# Patient Record
Sex: FEMALE | Race: WHITE | Employment: OTHER | URBAN - METROPOLITAN AREA
[De-identification: names, ages, dates, MRNs, and addresses within clinical notes are randomized per-mention and may not be internally consistent; named-entity substitution may affect disease eponyms.]

---

## 2024-05-18 RX ORDER — PANTOPRAZOLE SODIUM 40 MG/1
40 TABLET, DELAYED RELEASE ORAL DAILY
COMMUNITY
Start: 2024-04-30

## 2024-05-21 ENCOUNTER — LAB ENCOUNTER (OUTPATIENT)
Dept: LAB | Age: 89
DRG: 330 | End: 2024-05-21
Attending: COLON & RECTAL SURGERY

## 2024-05-21 ENCOUNTER — LAB ENCOUNTER (OUTPATIENT)
Dept: LAB | Age: 89
End: 2024-05-21
Attending: COLON & RECTAL SURGERY

## 2024-05-21 DIAGNOSIS — Z01.818 PREOP TESTING: ICD-10-CM

## 2024-05-21 LAB
ANION GAP SERPL CALC-SCNC: 5 MMOL/L (ref 0–18)
ANTIBODY SCREEN: POSITIVE
BASOPHILS # BLD AUTO: 0.06 X10(3) UL (ref 0–0.2)
BASOPHILS NFR BLD AUTO: 0.6 %
BUN BLD-MCNC: 17 MG/DL (ref 9–23)
BUN/CREAT SERPL: 24.3 (ref 10–20)
CALCIUM BLD-MCNC: 9.7 MG/DL (ref 8.7–10.4)
CHLORIDE SERPL-SCNC: 108 MMOL/L (ref 98–112)
CO2 SERPL-SCNC: 25 MMOL/L (ref 21–32)
CREAT BLD-MCNC: 0.7 MG/DL
DEPRECATED RDW RBC AUTO: 49.6 FL (ref 35.1–46.3)
DIRECT COOMBS POLY: NEGATIVE
EGFRCR SERPLBLD CKD-EPI 2021: 84 ML/MIN/1.73M2 (ref 60–?)
EOSINOPHIL # BLD AUTO: 0.24 X10(3) UL (ref 0–0.7)
EOSINOPHIL NFR BLD AUTO: 2.6 %
ERYTHROCYTE [DISTWIDTH] IN BLOOD BY AUTOMATED COUNT: 20.5 % (ref 11–15)
FYA ANTIGEN: NEGATIVE
GLUCOSE BLD-MCNC: 101 MG/DL (ref 70–99)
HCT VFR BLD AUTO: 30.8 %
HGB BLD-MCNC: 9.1 G/DL
IMM GRANULOCYTES # BLD AUTO: 0.02 X10(3) UL (ref 0–1)
IMM GRANULOCYTES NFR BLD: 0.2 %
LYMPHOCYTES # BLD AUTO: 2.38 X10(3) UL (ref 1–4)
LYMPHOCYTES NFR BLD AUTO: 25.6 %
MCH RBC QN AUTO: 20.4 PG (ref 26–34)
MCHC RBC AUTO-ENTMCNC: 29.5 G/DL (ref 31–37)
MCV RBC AUTO: 68.9 FL
MONOCYTES # BLD AUTO: 0.84 X10(3) UL (ref 0.1–1)
MONOCYTES NFR BLD AUTO: 9 %
NEUTROPHILS # BLD AUTO: 5.76 X10 (3) UL (ref 1.5–7.7)
NEUTROPHILS # BLD AUTO: 5.76 X10(3) UL (ref 1.5–7.7)
NEUTROPHILS NFR BLD AUTO: 62 %
OSMOLALITY SERPL CALC.SUM OF ELEC: 288 MOSM/KG (ref 275–295)
PLATELET # BLD AUTO: 311 10(3)UL (ref 150–450)
PLATELETS.RETICULATED NFR BLD AUTO: 6.2 % (ref 0–7)
POTASSIUM SERPL-SCNC: 4.2 MMOL/L (ref 3.5–5.1)
RBC # BLD AUTO: 4.47 X10(6)UL
RH BLOOD TYPE: POSITIVE
SODIUM SERPL-SCNC: 138 MMOL/L (ref 136–145)
WBC # BLD AUTO: 9.3 X10(3) UL (ref 4–11)

## 2024-05-21 PROCEDURE — 86077 PHYS BLOOD BANK SERV XMATCH: CPT

## 2024-05-21 PROCEDURE — 86922 COMPATIBILITY TEST ANTIGLOB: CPT

## 2024-05-21 PROCEDURE — 86901 BLOOD TYPING SEROLOGIC RH(D): CPT

## 2024-05-21 PROCEDURE — 85025 COMPLETE CBC W/AUTO DIFF WBC: CPT

## 2024-05-21 PROCEDURE — 86905 BLOOD TYPING RBC ANTIGENS: CPT

## 2024-05-21 PROCEDURE — 36415 COLL VENOUS BLD VENIPUNCTURE: CPT

## 2024-05-21 PROCEDURE — 86880 COOMBS TEST DIRECT: CPT

## 2024-05-21 PROCEDURE — 86850 RBC ANTIBODY SCREEN: CPT

## 2024-05-21 PROCEDURE — 85060 BLOOD SMEAR INTERPRETATION: CPT

## 2024-05-21 PROCEDURE — 80048 BASIC METABOLIC PNL TOTAL CA: CPT

## 2024-05-21 PROCEDURE — 86900 BLOOD TYPING SEROLOGIC ABO: CPT

## 2024-05-21 PROCEDURE — 86870 RBC ANTIBODY IDENTIFICATION: CPT

## 2024-05-22 ENCOUNTER — ANESTHESIA EVENT (OUTPATIENT)
Dept: SURGERY | Facility: HOSPITAL | Age: 89
DRG: 330 | End: 2024-05-22

## 2024-05-22 LAB — E ANTIGEN: NEGATIVE

## 2024-05-23 ENCOUNTER — ANESTHESIA (OUTPATIENT)
Dept: SURGERY | Facility: HOSPITAL | Age: 89
DRG: 330 | End: 2024-05-23

## 2024-05-23 ENCOUNTER — HOSPITAL ENCOUNTER (INPATIENT)
Facility: HOSPITAL | Age: 89
LOS: 5 days | Discharge: HOME | DRG: 330 | End: 2024-05-28
Attending: COLON & RECTAL SURGERY | Admitting: COLON & RECTAL SURGERY

## 2024-05-23 DIAGNOSIS — Z01.818 PREOP TESTING: Primary | ICD-10-CM

## 2024-05-23 DIAGNOSIS — K57.92 DIVERTICULITIS: ICD-10-CM

## 2024-05-23 LAB
HCT VFR BLD AUTO: 24.5 %
HCT VFR BLD AUTO: 31.6 %
HGB BLD-MCNC: 7.4 G/DL
HGB BLD-MCNC: 9.9 G/DL
RH BLOOD TYPE: POSITIVE

## 2024-05-23 PROCEDURE — 88305 TISSUE EXAM BY PATHOLOGIST: CPT | Performed by: COLON & RECTAL SURGERY

## 2024-05-23 PROCEDURE — 30233N1 TRANSFUSION OF NONAUTOLOGOUS RED BLOOD CELLS INTO PERIPHERAL VEIN, PERCUTANEOUS APPROACH: ICD-10-PCS | Performed by: ANESTHESIOLOGY

## 2024-05-23 PROCEDURE — 85018 HEMOGLOBIN: CPT | Performed by: NURSE ANESTHETIST, CERTIFIED REGISTERED

## 2024-05-23 PROCEDURE — 36430 TRANSFUSION BLD/BLD COMPNT: CPT | Performed by: STUDENT IN AN ORGANIZED HEALTH CARE EDUCATION/TRAINING PROGRAM

## 2024-05-23 PROCEDURE — 85014 HEMATOCRIT: CPT | Performed by: ANESTHESIOLOGY

## 2024-05-23 PROCEDURE — 85018 HEMOGLOBIN: CPT | Performed by: ANESTHESIOLOGY

## 2024-05-23 PROCEDURE — 0TN74ZZ RELEASE LEFT URETER, PERCUTANEOUS ENDOSCOPIC APPROACH: ICD-10-PCS | Performed by: COLON & RECTAL SURGERY

## 2024-05-23 PROCEDURE — 0TN64ZZ RELEASE RIGHT URETER, PERCUTANEOUS ENDOSCOPIC APPROACH: ICD-10-PCS | Performed by: COLON & RECTAL SURGERY

## 2024-05-23 PROCEDURE — P9045 ALBUMIN (HUMAN), 5%, 250 ML: HCPCS

## 2024-05-23 PROCEDURE — 88307 TISSUE EXAM BY PATHOLOGIST: CPT | Performed by: COLON & RECTAL SURGERY

## 2024-05-23 PROCEDURE — S0028 INJECTION, FAMOTIDINE, 20 MG: HCPCS | Performed by: COLON & RECTAL SURGERY

## 2024-05-23 PROCEDURE — 0WUF47Z SUPPLEMENT ABDOMINAL WALL WITH AUTOLOGOUS TISSUE SUBSTITUTE, PERCUTANEOUS ENDOSCOPIC APPROACH: ICD-10-PCS | Performed by: COLON & RECTAL SURGERY

## 2024-05-23 PROCEDURE — 0TQB4ZZ REPAIR BLADDER, PERCUTANEOUS ENDOSCOPIC APPROACH: ICD-10-PCS | Performed by: COLON & RECTAL SURGERY

## 2024-05-23 PROCEDURE — 0UQG4ZZ REPAIR VAGINA, PERCUTANEOUS ENDOSCOPIC APPROACH: ICD-10-PCS | Performed by: COLON & RECTAL SURGERY

## 2024-05-23 PROCEDURE — P9045 ALBUMIN (HUMAN), 5%, 250 ML: HCPCS | Performed by: NURSE ANESTHETIST, CERTIFIED REGISTERED

## 2024-05-23 PROCEDURE — 0DTN4ZZ RESECTION OF SIGMOID COLON, PERCUTANEOUS ENDOSCOPIC APPROACH: ICD-10-PCS | Performed by: COLON & RECTAL SURGERY

## 2024-05-23 PROCEDURE — 85014 HEMATOCRIT: CPT | Performed by: NURSE ANESTHETIST, CERTIFIED REGISTERED

## 2024-05-23 PROCEDURE — 0DJD8ZZ INSPECTION OF LOWER INTESTINAL TRACT, VIA NATURAL OR ARTIFICIAL OPENING ENDOSCOPIC: ICD-10-PCS | Performed by: COLON & RECTAL SURGERY

## 2024-05-23 PROCEDURE — 0T788DZ DILATION OF BILATERAL URETERS WITH INTRALUMINAL DEVICE, VIA NATURAL OR ARTIFICIAL OPENING ENDOSCOPIC: ICD-10-PCS | Performed by: COLON & RECTAL SURGERY

## 2024-05-23 RX ORDER — HYDROMORPHONE HYDROCHLORIDE 1 MG/ML
0.2 INJECTION, SOLUTION INTRAMUSCULAR; INTRAVENOUS; SUBCUTANEOUS EVERY 5 MIN PRN
Status: DISCONTINUED | OUTPATIENT
Start: 2024-05-23 | End: 2024-05-23 | Stop reason: HOSPADM

## 2024-05-23 RX ORDER — HYDROMORPHONE HYDROCHLORIDE 1 MG/ML
0.4 INJECTION, SOLUTION INTRAMUSCULAR; INTRAVENOUS; SUBCUTANEOUS EVERY 2 HOUR PRN
Status: DISCONTINUED | OUTPATIENT
Start: 2024-05-23 | End: 2024-05-28

## 2024-05-23 RX ORDER — INDOCYANINE GREEN AND WATER 25 MG
KIT INJECTION AS NEEDED
Status: DISCONTINUED | OUTPATIENT
Start: 2024-05-23 | End: 2024-05-23 | Stop reason: HOSPADM

## 2024-05-23 RX ORDER — HYDRALAZINE HYDROCHLORIDE 20 MG/ML
5 INJECTION INTRAMUSCULAR; INTRAVENOUS EVERY 10 MIN PRN
Status: DISCONTINUED | OUTPATIENT
Start: 2024-05-23 | End: 2024-05-23 | Stop reason: HOSPADM

## 2024-05-23 RX ORDER — MORPHINE SULFATE 4 MG/ML
4 INJECTION, SOLUTION INTRAMUSCULAR; INTRAVENOUS EVERY 10 MIN PRN
Status: DISCONTINUED | OUTPATIENT
Start: 2024-05-23 | End: 2024-05-23 | Stop reason: HOSPADM

## 2024-05-23 RX ORDER — ACETAMINOPHEN 500 MG
1000 TABLET ORAL ONCE
Status: DISCONTINUED | OUTPATIENT
Start: 2024-05-23 | End: 2024-05-23 | Stop reason: HOSPADM

## 2024-05-23 RX ORDER — SODIUM CHLORIDE, SODIUM LACTATE, POTASSIUM CHLORIDE, CALCIUM CHLORIDE 600; 310; 30; 20 MG/100ML; MG/100ML; MG/100ML; MG/100ML
INJECTION, SOLUTION INTRAVENOUS CONTINUOUS PRN
Status: DISCONTINUED | OUTPATIENT
Start: 2024-05-23 | End: 2024-05-23 | Stop reason: SURG

## 2024-05-23 RX ORDER — SODIUM CHLORIDE, SODIUM LACTATE, POTASSIUM CHLORIDE, CALCIUM CHLORIDE 600; 310; 30; 20 MG/100ML; MG/100ML; MG/100ML; MG/100ML
INJECTION, SOLUTION INTRAVENOUS CONTINUOUS
Status: DISCONTINUED | OUTPATIENT
Start: 2024-05-23 | End: 2024-05-28

## 2024-05-23 RX ORDER — OXYCODONE HYDROCHLORIDE 5 MG/1
5 TABLET ORAL EVERY 4 HOURS PRN
Status: DISCONTINUED | OUTPATIENT
Start: 2024-05-23 | End: 2024-05-28

## 2024-05-23 RX ORDER — HEPARIN SODIUM 5000 [USP'U]/ML
5000 INJECTION, SOLUTION INTRAVENOUS; SUBCUTANEOUS EVERY 8 HOURS SCHEDULED
Status: DISCONTINUED | OUTPATIENT
Start: 2024-05-24 | End: 2024-05-28

## 2024-05-23 RX ORDER — FAMOTIDINE 10 MG/ML
20 INJECTION, SOLUTION INTRAVENOUS DAILY
Status: DISCONTINUED | OUTPATIENT
Start: 2024-05-23 | End: 2024-05-28

## 2024-05-23 RX ORDER — HEPARIN SODIUM 5000 [USP'U]/ML
5000 INJECTION, SOLUTION INTRAVENOUS; SUBCUTANEOUS ONCE
Status: COMPLETED | OUTPATIENT
Start: 2024-05-23 | End: 2024-05-23

## 2024-05-23 RX ORDER — SODIUM CHLORIDE 9 MG/ML
INJECTION, SOLUTION INTRAVENOUS CONTINUOUS PRN
Status: DISCONTINUED | OUTPATIENT
Start: 2024-05-23 | End: 2024-05-23 | Stop reason: SURG

## 2024-05-23 RX ORDER — HYDROMORPHONE HYDROCHLORIDE 1 MG/ML
0.6 INJECTION, SOLUTION INTRAMUSCULAR; INTRAVENOUS; SUBCUTANEOUS EVERY 5 MIN PRN
Status: DISCONTINUED | OUTPATIENT
Start: 2024-05-23 | End: 2024-05-23 | Stop reason: HOSPADM

## 2024-05-23 RX ORDER — METRONIDAZOLE 500 MG/100ML
500 INJECTION, SOLUTION INTRAVENOUS ONCE
Status: COMPLETED | OUTPATIENT
Start: 2024-05-23 | End: 2024-05-23

## 2024-05-23 RX ORDER — METRONIDAZOLE 500 MG/100ML
500 INJECTION, SOLUTION INTRAVENOUS EVERY 8 HOURS
Status: COMPLETED | OUTPATIENT
Start: 2024-05-23 | End: 2024-05-24

## 2024-05-23 RX ORDER — FAMOTIDINE 20 MG/1
20 TABLET, FILM COATED ORAL DAILY
Status: DISCONTINUED | OUTPATIENT
Start: 2024-05-23 | End: 2024-05-28

## 2024-05-23 RX ORDER — MAGNESIUM OXIDE 400 MG/1
400 TABLET ORAL DAILY
Status: DISCONTINUED | OUTPATIENT
Start: 2024-05-23 | End: 2024-05-28

## 2024-05-23 RX ORDER — GLYCOPYRROLATE 0.2 MG/ML
INJECTION, SOLUTION INTRAMUSCULAR; INTRAVENOUS AS NEEDED
Status: DISCONTINUED | OUTPATIENT
Start: 2024-05-23 | End: 2024-05-23 | Stop reason: SURG

## 2024-05-23 RX ORDER — MORPHINE SULFATE 10 MG/ML
6 INJECTION, SOLUTION INTRAMUSCULAR; INTRAVENOUS EVERY 10 MIN PRN
Status: DISCONTINUED | OUTPATIENT
Start: 2024-05-23 | End: 2024-05-23 | Stop reason: HOSPADM

## 2024-05-23 RX ORDER — ROCURONIUM BROMIDE 10 MG/ML
INJECTION, SOLUTION INTRAVENOUS AS NEEDED
Status: DISCONTINUED | OUTPATIENT
Start: 2024-05-23 | End: 2024-05-23 | Stop reason: SURG

## 2024-05-23 RX ORDER — NALOXONE HYDROCHLORIDE 0.4 MG/ML
80 INJECTION, SOLUTION INTRAMUSCULAR; INTRAVENOUS; SUBCUTANEOUS AS NEEDED
Status: DISCONTINUED | OUTPATIENT
Start: 2024-05-23 | End: 2024-05-23 | Stop reason: HOSPADM

## 2024-05-23 RX ORDER — SODIUM CHLORIDE, SODIUM LACTATE, POTASSIUM CHLORIDE, CALCIUM CHLORIDE 600; 310; 30; 20 MG/100ML; MG/100ML; MG/100ML; MG/100ML
INJECTION, SOLUTION INTRAVENOUS CONTINUOUS
Status: DISCONTINUED | OUTPATIENT
Start: 2024-05-23 | End: 2024-05-23 | Stop reason: HOSPADM

## 2024-05-23 RX ORDER — METOCLOPRAMIDE HYDROCHLORIDE 5 MG/ML
10 INJECTION INTRAMUSCULAR; INTRAVENOUS EVERY 8 HOURS PRN
Status: DISCONTINUED | OUTPATIENT
Start: 2024-05-23 | End: 2024-05-28

## 2024-05-23 RX ORDER — MAGNESIUM SULFATE HEPTAHYDRATE 500 MG/ML
INJECTION, SOLUTION INTRAMUSCULAR; INTRAVENOUS AS NEEDED
Status: DISCONTINUED | OUTPATIENT
Start: 2024-05-23 | End: 2024-05-23 | Stop reason: SURG

## 2024-05-23 RX ORDER — HYDROMORPHONE HYDROCHLORIDE 1 MG/ML
0.4 INJECTION, SOLUTION INTRAMUSCULAR; INTRAVENOUS; SUBCUTANEOUS EVERY 5 MIN PRN
Status: DISCONTINUED | OUTPATIENT
Start: 2024-05-23 | End: 2024-05-23 | Stop reason: HOSPADM

## 2024-05-23 RX ORDER — ALBUMIN, HUMAN INJ 5% 5 %
SOLUTION INTRAVENOUS CONTINUOUS PRN
Status: DISCONTINUED | OUTPATIENT
Start: 2024-05-23 | End: 2024-05-23 | Stop reason: SURG

## 2024-05-23 RX ORDER — EPHEDRINE SULFATE 50 MG/ML
INJECTION, SOLUTION INTRAVENOUS AS NEEDED
Status: DISCONTINUED | OUTPATIENT
Start: 2024-05-23 | End: 2024-05-23 | Stop reason: SURG

## 2024-05-23 RX ORDER — LIDOCAINE HYDROCHLORIDE 10 MG/ML
INJECTION, SOLUTION EPIDURAL; INFILTRATION; INTRACAUDAL; PERINEURAL AS NEEDED
Status: DISCONTINUED | OUTPATIENT
Start: 2024-05-23 | End: 2024-05-23 | Stop reason: SURG

## 2024-05-23 RX ORDER — SENNOSIDES 8.6 MG
17.2 TABLET ORAL NIGHTLY PRN
Status: DISCONTINUED | OUTPATIENT
Start: 2024-05-23 | End: 2024-05-28

## 2024-05-23 RX ORDER — POLYETHYLENE GLYCOL 3350 17 G/17G
17 POWDER, FOR SOLUTION ORAL DAILY PRN
Status: DISCONTINUED | OUTPATIENT
Start: 2024-05-23 | End: 2024-05-28

## 2024-05-23 RX ORDER — HYDROMORPHONE HYDROCHLORIDE 1 MG/ML
0.2 INJECTION, SOLUTION INTRAMUSCULAR; INTRAVENOUS; SUBCUTANEOUS EVERY 2 HOUR PRN
Status: DISCONTINUED | OUTPATIENT
Start: 2024-05-23 | End: 2024-05-28

## 2024-05-23 RX ORDER — ONDANSETRON 2 MG/ML
4 INJECTION INTRAMUSCULAR; INTRAVENOUS EVERY 6 HOURS PRN
Status: DISCONTINUED | OUTPATIENT
Start: 2024-05-23 | End: 2024-05-28

## 2024-05-23 RX ORDER — LIDOCAINE HYDROCHLORIDE ANHYDROUS AND DEXTROSE MONOHYDRATE .8; 5 G/100ML; G/100ML
INJECTION, SOLUTION INTRAVENOUS CONTINUOUS PRN
Status: DISCONTINUED | OUTPATIENT
Start: 2024-05-23 | End: 2024-05-23 | Stop reason: SURG

## 2024-05-23 RX ORDER — SODIUM CHLORIDE, SODIUM LACTATE, POTASSIUM CHLORIDE, CALCIUM CHLORIDE 600; 310; 30; 20 MG/100ML; MG/100ML; MG/100ML; MG/100ML
2 INJECTION, SOLUTION INTRAVENOUS CONTINUOUS
Status: DISCONTINUED | OUTPATIENT
Start: 2024-05-23 | End: 2024-05-26

## 2024-05-23 RX ORDER — DEXAMETHASONE SODIUM PHOSPHATE 4 MG/ML
VIAL (ML) INJECTION AS NEEDED
Status: DISCONTINUED | OUTPATIENT
Start: 2024-05-23 | End: 2024-05-23 | Stop reason: SURG

## 2024-05-23 RX ORDER — MORPHINE SULFATE 4 MG/ML
2 INJECTION, SOLUTION INTRAMUSCULAR; INTRAVENOUS EVERY 10 MIN PRN
Status: DISCONTINUED | OUTPATIENT
Start: 2024-05-23 | End: 2024-05-23 | Stop reason: HOSPADM

## 2024-05-23 RX ORDER — PHENYLEPHRINE HCL 10 MG/ML
VIAL (ML) INJECTION AS NEEDED
Status: DISCONTINUED | OUTPATIENT
Start: 2024-05-23 | End: 2024-05-23 | Stop reason: SURG

## 2024-05-23 RX ORDER — OXYCODONE HYDROCHLORIDE 5 MG/1
2.5 TABLET ORAL EVERY 4 HOURS PRN
Status: DISCONTINUED | OUTPATIENT
Start: 2024-05-23 | End: 2024-05-28

## 2024-05-23 RX ORDER — ONDANSETRON 2 MG/ML
INJECTION INTRAMUSCULAR; INTRAVENOUS AS NEEDED
Status: DISCONTINUED | OUTPATIENT
Start: 2024-05-23 | End: 2024-05-23 | Stop reason: SURG

## 2024-05-23 RX ADMIN — SODIUM CHLORIDE, SODIUM LACTATE, POTASSIUM CHLORIDE, CALCIUM CHLORIDE: 600; 310; 30; 20 INJECTION, SOLUTION INTRAVENOUS at 11:04:00

## 2024-05-23 RX ADMIN — EPHEDRINE SULFATE 5 MG: 50 INJECTION, SOLUTION INTRAVENOUS at 08:57:00

## 2024-05-23 RX ADMIN — ONDANSETRON 4 MG: 2 INJECTION INTRAMUSCULAR; INTRAVENOUS at 17:30:00

## 2024-05-23 RX ADMIN — METRONIDAZOLE 500 MG: 500 INJECTION, SOLUTION INTRAVENOUS at 07:38:00

## 2024-05-23 RX ADMIN — LIDOCAINE HYDROCHLORIDE 25 MG: 10 INJECTION, SOLUTION EPIDURAL; INFILTRATION; INTRACAUDAL; PERINEURAL at 07:40:00

## 2024-05-23 RX ADMIN — EPHEDRINE SULFATE 5 MG: 50 INJECTION, SOLUTION INTRAVENOUS at 09:05:00

## 2024-05-23 RX ADMIN — ROCURONIUM BROMIDE 10 MG: 10 INJECTION, SOLUTION INTRAVENOUS at 13:23:00

## 2024-05-23 RX ADMIN — SODIUM CHLORIDE, SODIUM LACTATE, POTASSIUM CHLORIDE, CALCIUM CHLORIDE: 600; 310; 30; 20 INJECTION, SOLUTION INTRAVENOUS at 11:08:00

## 2024-05-23 RX ADMIN — ALBUMIN, HUMAN INJ 5%: 5 SOLUTION INTRAVENOUS at 10:11:00

## 2024-05-23 RX ADMIN — PHENYLEPHRINE HCL 100 MCG: 10 MG/ML VIAL (ML) INJECTION at 09:06:00

## 2024-05-23 RX ADMIN — DEXAMETHASONE SODIUM PHOSPHATE 4 MG: 4 MG/ML VIAL (ML) INJECTION at 08:01:00

## 2024-05-23 RX ADMIN — SODIUM CHLORIDE, SODIUM LACTATE, POTASSIUM CHLORIDE, CALCIUM CHLORIDE: 600; 310; 30; 20 INJECTION, SOLUTION INTRAVENOUS at 07:38:00

## 2024-05-23 RX ADMIN — PHENYLEPHRINE HCL 100 MCG: 10 MG/ML VIAL (ML) INJECTION at 13:18:00

## 2024-05-23 RX ADMIN — METRONIDAZOLE 500 MG: 500 INJECTION, SOLUTION INTRAVENOUS at 15:26:00

## 2024-05-23 RX ADMIN — EPHEDRINE SULFATE 5 MG: 50 INJECTION, SOLUTION INTRAVENOUS at 12:21:00

## 2024-05-23 RX ADMIN — MAGNESIUM SULFATE HEPTAHYDRATE 1 G: 500 INJECTION, SOLUTION INTRAMUSCULAR; INTRAVENOUS at 08:24:00

## 2024-05-23 RX ADMIN — ROCURONIUM BROMIDE 10 MG: 10 INJECTION, SOLUTION INTRAVENOUS at 11:35:00

## 2024-05-23 RX ADMIN — LIDOCAINE HYDROCHLORIDE ANHYDROUS AND DEXTROSE MONOHYDRATE 1 MG/MIN: .8; 5 INJECTION, SOLUTION INTRAVENOUS at 11:49:00

## 2024-05-23 RX ADMIN — LIDOCAINE HYDROCHLORIDE ANHYDROUS AND DEXTROSE MONOHYDRATE 1 MG/MIN: .8; 5 INJECTION, SOLUTION INTRAVENOUS at 08:13:00

## 2024-05-23 RX ADMIN — SODIUM CHLORIDE: 9 INJECTION, SOLUTION INTRAVENOUS at 08:00:00

## 2024-05-23 RX ADMIN — ROCURONIUM BROMIDE 10 MG: 10 INJECTION, SOLUTION INTRAVENOUS at 11:05:00

## 2024-05-23 RX ADMIN — PHENYLEPHRINE HCL 100 MCG: 10 MG/ML VIAL (ML) INJECTION at 12:24:00

## 2024-05-23 RX ADMIN — SODIUM CHLORIDE, SODIUM LACTATE, POTASSIUM CHLORIDE, CALCIUM CHLORIDE: 600; 310; 30; 20 INJECTION, SOLUTION INTRAVENOUS at 10:06:00

## 2024-05-23 RX ADMIN — ROCURONIUM BROMIDE 10 MG: 10 INJECTION, SOLUTION INTRAVENOUS at 08:46:00

## 2024-05-23 RX ADMIN — GLYCOPYRROLATE 0.1 MG: 0.2 INJECTION, SOLUTION INTRAMUSCULAR; INTRAVENOUS at 08:38:00

## 2024-05-23 RX ADMIN — ROCURONIUM BROMIDE 35 MG: 10 INJECTION, SOLUTION INTRAVENOUS at 08:01:00

## 2024-05-23 RX ADMIN — EPHEDRINE SULFATE 5 MG: 50 INJECTION, SOLUTION INTRAVENOUS at 08:38:00

## 2024-05-23 RX ADMIN — SODIUM CHLORIDE: 9 INJECTION, SOLUTION INTRAVENOUS at 17:28:00

## 2024-05-23 RX ADMIN — ROCURONIUM BROMIDE 20 MG: 10 INJECTION, SOLUTION INTRAVENOUS at 14:57:00

## 2024-05-23 RX ADMIN — SODIUM CHLORIDE: 9 INJECTION, SOLUTION INTRAVENOUS at 11:08:00

## 2024-05-23 RX ADMIN — LIDOCAINE HYDROCHLORIDE ANHYDROUS AND DEXTROSE MONOHYDRATE 0.5 MG/MIN: .8; 5 INJECTION, SOLUTION INTRAVENOUS at 12:05:00

## 2024-05-23 RX ADMIN — ROCURONIUM BROMIDE 5 MG: 10 INJECTION, SOLUTION INTRAVENOUS at 07:48:00

## 2024-05-23 RX ADMIN — LIDOCAINE HYDROCHLORIDE 25 MG: 10 INJECTION, SOLUTION EPIDURAL; INFILTRATION; INTRACAUDAL; PERINEURAL at 07:48:00

## 2024-05-23 RX ADMIN — EPHEDRINE SULFATE 5 MG: 50 INJECTION, SOLUTION INTRAVENOUS at 12:08:00

## 2024-05-23 NOTE — ANESTHESIA POSTPROCEDURE EVALUATION
Patient: Marisol Maher    Procedure Summary       Date: 05/23/24 Room / Location: [facility] MAIN OR 04 / [facility] MAIN OR    Anesthesia Start: 0738 Anesthesia Stop:     Procedures:       Cystoscopy, dual ureteral bilateral lighted stents (Shane), Proctoscopy, laparoscopic sigmoid resection, mobilization of splenic flexure, cystorrhaphy (Left: Abdomen)      LIGHTED URETERAL STENT INSERTION (Bilateral: Ureter) Diagnosis: (Divericular disease, colovaginal fistula)    Surgeons: Bernardo Gupta MD; Beni Lynn MD Anesthesiologist: Love Ridley MD    Anesthesia Type: general ASA Status: 2            Anesthesia Type: general    Vitals Value Taken Time   /66 05/23/24 1801   Temp 98 °F (36.7 °C) 05/23/24 1801   Pulse 89 05/23/24 1801   Resp 21 05/23/24 1801   SpO2 97 % 05/23/24 1801   Vitals shown include unfiled device data.    [facility] AN Post Evaluation:   Patient Evaluated in PACU  Patient Participation: complete - patient participated  Level of Consciousness: sleepy but conscious  Pain Score: 0  Pain Management: adequate  Airway Patency:patent  Dental exam unchanged from preop  Yes    Cardiovascular Status: acceptable  Respiratory Status: acceptable  Postoperative Hydration acceptable      Evaristo Jha CRNA  5/23/2024 6:02 PM

## 2024-05-23 NOTE — ANESTHESIA PROCEDURE NOTES
Arterial Line    Date/Time: 5/23/2024 7:50 AM    Performed by: Nehal Valdez CRNA  Authorized by: Cali Rueda MD    General Information and Staff    Procedure Start:  5/23/2024 7:50 AM  Procedure End:  5/23/2024 7:59 AM  Anesthesiologist:  Cali Rueda MD  Performed By:  Anesthesiologist  Patient Location:  OR  Indication: continuous blood pressure monitoring and blood sampling needed    Site Identification: real time ultrasound guided, surface landmarks and image stored and retrievable    Preanesthetic Checklist: 2 patient identifiers, IV checked, risks and benefits discussed, monitors and equipment checked, pre-op evaluation, timeout performed, anesthesia consent and sterile technique used    Procedure Details    Catheter Size:  20 G  Catheter Type:  Arrow  Seldinger Technique?: Yes    Laterality:  Left  Site:  Radial artery  Site Prep: chlorhexidine    Line Secured:  Wrist Brace and Tegaderm    Assessment    Events: patient tolerated procedure well with no complications      Medications  5/23/2024 7:50 AM      Additional Comments

## 2024-05-23 NOTE — BRIEF OP NOTE
Pre-Operative Diagnosis: Divericular disease, colovaginal fistula     Post-Operative Diagnosis: Divericular disease, colovaginal fistula      Procedure Performed:   Cystoscopy, dual ureteral bilateral lighted stents (Shane), Proctoscopy, laparoscopic sigmoid resection, mobilization of splenic flexure, cystorrhaphy    Surgeons and Role:  Panel 1:     * Bernardo Gupta MD - Primary     * Beni Lynn MD - Assisting Surgeon  Panel 2:     * Beni Lynn MD - Primary    Assistant(s):  Surgical Assistant.: Jamila Oliveira CSA     Surgical Findings:   Sigmoid colon twisted, kinked, and densely fused over a broad area at the pelvic inlet  Fistula to both vagina and bladder, both repaired     Specimen: sigmoid colon and donuts     Estimated Blood Loss: 250 mL    Bernardo Gupta MD  5/23/2024  6:01 PM

## 2024-05-23 NOTE — OPERATIVE REPORT
Operative Note    Patient Name: Marisol Maher    Date of Surgery: 05/23/24     Preoperative Diagnosis: Divericular disease, colovaginal fistula    Postoperative Diagnosis: same    Primary Surgeon: Bernardo Gupta MD    Assistant: NEGRO SAMUELS    Procedures:   Cystoscopy and bilateral ureteral stents,  Laparoscopic sigmoid colectomy, colorectal anastomosis,   mobilization of splenic flexure,   Ureterolysis, bilateral  Cystorrhaphy  Vaginoplasty  Omentoplasty    Surgical Findings:   Sigmoid colon twisted, kinked, and densely fused over a broad area at the pelvic inlet  Fistula to both vagina and bladder, both repaired  Inflammatory nodule visible in bladder at site of fistula     Specimen: sigmoid colon and donuts     Estimated Blood Loss: 250 mL    Anesthesia: General    Complications: none    Implants: none    Drains: 15 Fr Ever in pelvis, Vasquez catheter    Condition: good      DESCRIPTION OF PROCEDURE    INDICATION   The patient is a 88 year old year old female with diverticulitis with known colovaginal fistula. She has also had UTIs which were thought to be due to vaginal contamination of the meatus.    The procedure, indications, risks, benefits, and alternatives were discussed with the patient prior to the procedure. All questions were answered, and the patient wished to proceed.    TECHNIQUE  The patient was taken to the operating room and placed supine on the operating table. General anesthesia was induced and he was then repositioned in modified lithotomy using Madi Aries stirrups with appropriate attention to all joints and pressure points.      Dr. Lynn performed cystoscopy and ureteral stent placement.    The abdomen was prepared with Chloraprep and draped in the usual sterile fashion. Timeout was called to reconfirm the patient's identity, diagnosis, planned procedure, and completeness of preoperative preparations.    The procedure began with placement of a Camilo cannula at the umbilicus. This was  done using an open technique. The abdomen was safely entered without injury to underlying viscera. A 0-Vicryl pursestring suture was placed at this location to allow for maintenance of pneumoperitoneum and later for closure of the fascia at the end of the operation. Additional cannulas were placed. Three 5 mm cannulas were placed with 1 in the left lower quadrant and 2 on the right side of the abdomen. We then placed a 12 mm Airseal in the suprapubic position through the existing Pfannenstiel incision. An additional 5 mm cannula was needed in the LUQ to free adhesions to the abdominal wall in the RLQ from prior surgery.      We then placed the patient in Trendelenburg with the right side down. Transverse colon was placed up into the upper abdomen. Survey of the abdomen did not demonstrate any abnormalities other than LLQ adhesions. The mesentery to the sigmoid colon was identified. An incision was made at the sacral promontory and then carried up over the inferior mesenteric artery pedicle and towards the ligament of Treitz on the left lateral aspect. The Ligasure was used to dissect through the tissue and divide with hemostasis. We dissected the left mesocolon off of the retroperitoneum and were able to identify the left ureter and gonadal vessels proximally near the MAXIMO. 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 Left ureterolysis was performed with sharp and blunt dissection to be able to free the ureter and push it posteriorly free from injury. The MAXIMO pedicle was then dissected and we were able to divide the MAXIMO using Ligasure with excellent hemostasis just distal to the takeoff of the left colic artery. The IMV was divided at this level as well. We then continued dissection underneath the sigmoid mesocolon laterally to the abdominal sidewall  and cephalad over the left kidney to reach a point under the splenic flexure.     Having completed the medial phase of dissection, we then divided the lateral peritoneal attachments at the white line of Toldt. The splenic flexure was then taken down sharply with use of the Ligasure without injury to the spleen or tail of the pancreas. The mesentery to the splenic flexure was preserved. Having completed the abdominal phase of the operation, attention was then turned towards the pelvic phase of the operation.     The sigmoid colon was very thickened, hard and had a hairpin turn fused to the posterior wall of the bladder. The colon was dissected off of the bladder using cautery staying close to the colon wall. A 1 cm communication was present between sigmoid colon and bladder, leaving a 2 cm defect in the bladder once the fistula was taken down. Additionally, a 1 cm communication was present between the sigmoid colon and the vaginal cuff.     The lateral peritoneal attachments of the rectum were divided down to the upper third of the rectum. There was dense scar tissue along the right side of the proximal rectum encroaching on the ureter over a distance of 6 cm. Right ureterolysis was performed with sharp and blunt dissection to be able to free the ureter and push it posteriorly free from injury. This allowed us to reach to soft rectal wall in the proximal rectum distal to the confluent layer of tenia and proximal to the anterior peritoneal reflection. The mesorectum was elevated off of the presacral space by bluntly dissecting in the plane between the fascia propria of the rectum and Waldeyer fascia. Once the rectum was circumferentially mobilized , the mesorectum was divided at this level using Ligasure. We then divided the rectum at this level using the Insignia 45 stapler with 2 firings.     With fistula taken down and the distal line of resection divided, the posterior wall of the bladder was accessible for repair  of the fistula site. The mucosal surface and the edge of the defect were localized and then closed using a running 3-0 V-loc monofilament absorbable suture. This was tested filling and distending the bladder with 300 mL of saline and there was no further leakage from the bladder.     The vaginal cuff was accessible for repair of the fistula site. The mucosal surface was invaginated and the edges of the defect were localized and then closed using a running 3-0 V-loc monofilament absorbable suture in the muscular and scarred wall of the vagina.     The proximal margin which easily reached down to the pelvis was identified and marked. The remaining mesentery and the proximal colon at the proximal line of resection was divided with the Ligasure.  ICG fluorescence angiography was performed using [employer] system. Excellent perfusion was noted at the proposed/prepared proximal and distal lines of resection.      The specimen was extracted out through the suprapubic incision which was lengthened along the existing scar. The Ayaan wound retractor was placed and then the specimen was exteriorized. The colon was then divided and a 29 mm anvil was placed through the open end of the colon and brought out through a colotomy on the antimesenteric wall of the colon. The open end of the colon was then stapled closed using another firing of the Insignia stapler. A 3-0 Vicryl U-stitch was placed at the stem of anvil to help support it during docking. The proximal colon was irrigated and returned to the abdominal cavity.     The pneumoperitoneum was reestablished, and the proximal colon was oriented to lie without torsion. The cartridge of the 29 mm circular stapler was advanced transanally up to the top of the rectal stump under laparoscopic vision. The spike of the cartridge was advanced through the staple lines in the rectal stump. The anvil and cartridge were mated and closed. Fifteen seconds were allowed to pass to allow tissue  compression and optimal staple formation. The orientation of the mesentery to reach the pelvis without tension or torsion was again confirmed, as well as ensuring that there was no small bowel herniating under the left colon. After all inspections were satisfactory, the circular stapler was fired. Both donuts were inspected and found to be circumferentially intact. The anastomosis was then tested with air insufflation under saline submersion using the proctoscope. With proximal compression, the anastomosis distended well and transanal passage of gas occurred around the anastomosis tested. There were no air bubbles, and therefore, a secure anastomosis had been achieved. The pelvis was irrigated and a #15 Ever drain was placed in the presacral space through a separate stab incision in the left lower quadrant. It was secured to the skin with a drain stitch. The small bowel was run its entire length and laid in anatomic position.    The omentum was mobilized off of the distal transverse colon using Ligasure. This allowed us to create a flap of omentum which was placed over the small bowel down into the pelvis between the bladder and rectum.     We then removed the cannulas and closed the incisions. The transverse suprapubic incision was closed using running #1 PDS suture. The umbilical incision was closed by tying the previously placed 0 Vicryl pursestring suture. Subcutaneous tissues were irrigated with saline and then hemostasis secured with cautery. Skin was closed using 4-0 Vicryl subcuticular sutures. The skin was cleansed and Dermabond was applied.      The patient was then allowed to emerge from anesthesia without incident. The patient was extubated in the operating room and taken to the recovery room in satisfactory condition.     Please note, this operation was significantly prolonged and required substantial effort beyond a standard procedure due to extensive scarring in the retroperitoneum with both ureters  closely adherent to scarred sigmoid mesentery, broad fusion of sigmoid colon to posterior wall of bladder, separate 1 cm communications between sigmoid colon and bladder and between sigmoid colon and vagina. It consumed 9 hours and exceeded normal operative time by 6 hours.     (Ms. Oliveira' skilled assistance was necessary for patient positioning, prepping, instrument passing and holding, retraction, suturing, and camera holding.)    _____________________________________   Attending Surgeon: Bernardo Gupta MD   Dictated By: Bernardo Gupta MD

## 2024-05-23 NOTE — ANESTHESIA PROCEDURE NOTES
Airway  Date/Time: 5/23/2024 7:52 AM  Urgency: Elective    Airway not difficult    General Information and Staff    Patient location during procedure: OR  Anesthesiologist: Cali Rueda MD  Resident/CRNA: Nehal Valdez CRNA  Performed: CRNA   Performed by: Nehal Valdez CRNA  Authorized by: Cali Rueda MD      Indications and Patient Condition  Indications for airway management: anesthesia  Sedation level: deep  Preoxygenated: yes  Patient position: sniffing  Mask difficulty assessment: 1 - vent by mask    Final Airway Details  Final airway type: endotracheal airway      Successful airway: ETT  Cuffed: yes   Successful intubation technique: Video laryngoscopy  Endotracheal tube insertion site: oral  Blade: GlideScope  Blade size: #3  ETT size (mm): 7.0    Cormack-Lehane Classification: grade IIA - partial view of glottis  Placement verified by: capnometry   Measured from: lips  ETT to lips (cm): 21  Number of attempts at approach: 1    Additional Comments  Dental exam unchanged from pre op  Dental condition remains poor with multiple loose, cracked and blackened teeth.  Soft guaze bite block between molars

## 2024-05-23 NOTE — ANESTHESIA PROCEDURE NOTES
Peripheral IV  Date/Time: 5/23/2024 8:00 AM  Inserted by: Nehal Valdez CRNA    Placement  Needle size: 20 G  Laterality: left  Location: hand  Site prep: alcohol  Technique: anatomical landmarks  Attempts: 1

## 2024-05-23 NOTE — H&P
[facility]  Office Visit - Colon and Rectal Surgery  Bernardo Gupta MD      CHIEF COMPLAINT: Patient presents for surgery for colovaginal fistula      HISTORY OF PRESENT ILLNESS:  Marisol Maher is a 88 year old female who presents for evaluation of colovaginal fistula.  She is here today with her son, Griffin, and daughter, Mitra.    INTERVAL HISTORY  Preop colonoscopy was attempted yesterday, incomplete.    Prior to this, she was recently hospitalized at [facility] for a bleeding gastric ulcer. This required endoscopic cauterization and radiologic embolization.    She has since seen her gastroenterologist and had an EGD performed.  She was cleared for proceeding with surgery for the fistula by both her primary doctor and gastroenterologist.    She continues to note stool passing from the vagina, spotting throughout the day.  This will occur for several days in a row and then may stop for several days.  She has not been noticing flatus through the vagina.  She continues to have irritative voiding symptoms with urinary frequency and urgency.  However, she has not had documented urine infection in the last few months or needed antibiotics.    Bowel habits remain unchanged.    INITIAL HISTORY (10/17/13784)  She has been experiencing leakage of stool from the vagina for the last year, since recovering from a bleeding peptic ulcer.  Leakage occurs daily, it is a small quantity, and occurs without the sensation of a need to pass stool.  She has been having frequent UTIs, and the fecal contamination of the area is thought to be because of it.  Cystoscopy was attempted, but aborted secondary to stool in the vagina.  She has also had several CT scans, with and without contrast.    Bowel habits: Unchanged  Frequency: Every other day  Consistency: Formed  Urge: She is able to sense the urge for a BM  Continence: Intact     She has never had a colonoscopy.  She does have a history of  hysterectomy.    HISTORY:  History reviewed. No pertinent past medical history.   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 No pertinent family history.   Social History  Tobacco Use  Smoking status: Never  Smokeless tobacco: Never  Vaping Use  Vaping Use: Never used  Alcohol use: Never  Drug use: Never      CURRENT MEDICATIONS:   Current Outpatient Medications   Medication Sig Dispense Refill   pantoprazole 40 MG Oral Tab EC Take 1 tablet (40 mg total) by mouth once daily. 90 tablet 1   NYSTATIN 360610 UNIT/GM External Cream APPLY TOPICALLY TO THE AFFECTED AREA TWICE DAILY (Patient not taking: Reported on 3/27/2024) 60 g 0         ALLERGIES:  Patient has no allergy information on record.    REVIEW OF SYSTEMS:  Constitutional: normal  Eyes: Corrective lenses   Ears/Nose/Throat: Sinus problems  Respiratory: Chronic cough  Cardiac/Vascular: normal  GI: See HPI  : Dysuria, gas/stool with urine  Musculoskeletal: Arthritis  Skin: Skin rash  Neurologic: normal  Psychiatric: normal  Endocrine: normal  Hematology/Lymphatics: history blood transfusion  Allergy/Immunology: normal    PHYSICAL EXAM:   Ht 5' 2\" (1.575 m)  Wt 150 lb (68 kg)  BMI 27.44 kg/m²   Body mass index is 27.44 kg/m².    CONSTITUTIONAL: awake, alert, cooperative, no apparent distress, and appears stated age  EYES: Lids and lashes normal, sclera clear, conjunctiva normal  ENT: Normocephalic, without obvious abnormality, atraumatic  NECK: Supple, symmetrical, trachea midline, no adenopathy, thyroid symmetric, not enlarged and no tenderness  HEMATOLOGIC/LYMPHATICS: No cervical lymphadenopathy, no supraclavicular lymphadenopathy, no inguinal lymphadenopathy  LUNGS: No increased work of breathing, good air exchange, clear to auscultation bilaterally, no crackles or wheezing  CARDIOVASCULAR: Normal apical impulse, regular rate and rhythm, and  no murmur noted, no pedal edema  ABDOMEN: RLQ transverse and Pfannenstiel scars healed without concern, normal bowel sounds, soft, non-distended, non-tender, no masses palpated, no hepatosplenomegaly    RECTAL:    MUSCULOSKELETAL: Full range of motion noted. Motor strength is 5 out of 5 all extremities bilaterally.   SKIN: no rashes and no jaundice  PSYCHIATRIC:   Orientation: normal  Appearance: normal  Behavior: normal  Attitude toward examiner: normal  Affect: normal  Judgment: Normal    DATA:   CT UROGRAM - 9/19/2023    CLINICAL INDICATION: Recurrent cystitis.    FINDINGS:   ...    BOWEL: There is no bowel obstruction. There is diverticulosis. Abutment of the sigmoid canal with  probable fistulous connection to the vaginal cuff is redemonstrated (4/122-126), with air and  probable colonic content within the vaginal canal. There is tethering of the sigmoid colon (4/115).  Moderate stool burden. Appendix is surgically absent.    There is no free air or significant free fluid.    PELVIC UROGENITAL STRUCTURES: The bladder is moderately distended and grossly unremarkable. The  uterus is surgically absent.     IMPRESSION:  * Similar appearance of fistulous communication between the sigmoid and the vaginal cuff.    * No nephrolithiasis or hydronephrosis. No solid renal lesions.      COLONOSCOPY 5/22/2024 - Dr. ANNA Burris  Findings:  Severe stricture at sigmoid colon at 25 cm with associated diverticulosis. Neither the pediatric colonoscope nor adult gastroscope would pass despite multiple attempts and use of manual pressure. The visualized colonic mucosa was normal with the exception of the findings below. There was scattered diverticulosis in the visualized sigmoid colon. There was no evidence of ulcerations, colitis, vascular anomalies, polyps or mass lesions. Retroflexed view of the anus revealed no internal hemorrhoids. Digital rectal exam was normal.    Impression:  - Incomplete colonoscopy due to severe stricture at  the sigmoid colon. Sigmoid colon extent reached.  - Diverticulosis     ASSESSMENT AND PLAN:  Colovaginal fistula  Suspect originating from sigmoid colon diverticular disease based on CT urogram  She has not had prior colonoscopy to exclude cancer as etiology or coexisting in another segment of the colon  Preoperative medical clearance has been obtained  Colonoscopy yesterday, incomplete     We discussed lack of clearance of proximal colon and options;      - proceed with laparoscopy and defer colon clearance to a future date      - laparotomy to provide ability to do intra-op colonoscopy      - if laparotomy needed to complete surgery, add intra-op colonoscopy    Frequent UTIs  Thought related to genitourinary contamination by passage of stool through the vagina  None in the last few months    Gastric ulcer  Recent hospitalization for bleeding, posttreatment EGD cleared for surgery    Ready to proceed with   -Cystoscopy with lighted ureteral stent placement,   -Laparoscopic sigmoid colectomy, possible open  -Colonoscopy during surgery if laparotomy needed to complete colectomy    Procedure, indications, risks, benefits, and alternatives discussed with patient and son. All questions answered. They understand and she wishes to proceed.      The patient was educated regarding the condition, treatment options, and instructed in the above treatment plan.    Bernardo Gupta MD, MD, FACS, FASCRS

## 2024-05-23 NOTE — ANESTHESIA PREPROCEDURE EVALUATION
Anesthesia PreOp Note    HPI:     Marisol Maher is a 88 year old female who presents for preoperative consultation requested by: Bernardo Gupta MD    Date of Surgery: 5/23/2024    Procedure(s):  Cystoscopy, dual ureteral bilateral lighted stents (Shane), Proctoscopy, laparoscopic sigmoid resection, possible open laparotomy (Brand)  LIGHTED URETERAL STENT INSERTION  Indication: Divericular disease, colovaginal fistula    Relevant Problems   No relevant active problems       NPO:  Last Liquid Consumption Date: 05/22/24  Last Liquid Consumption Time: 2300  Last Solid Consumption Date: 05/20/24  Last Solid Consumption Time: 1800  Last Liquid Consumption Date: 05/22/24          History Review:  There are no problems to display for this patient.      Past Medical History:   • Diverticulosis of large intestine   • History of blood transfusion    2022, no reactions   • History of stomach ulcers   • Osteoarthritis   • Visual impairment    Blind in one eye       Past Surgical History:   Procedure Laterality Date   • Appendectomy     • Colonoscopy     • Hysterectomy     • Other surgical history  2024    Stomach ulcer embolization   • Total knee replacement Bilateral    • Upper gi endoscopy,exam         Medications Prior to Admission   Medication Sig Dispense Refill Last Dose   • pantoprazole 40 MG Oral Tab EC Take 1 tablet (40 mg total) by mouth daily.   5/21/2024     Current Facility-Administered Medications Ordered in Epic   Medication Dose Route Frequency Provider Last Rate Last Admin   • lactated ringers infusion   Intravenous Continuous Bernardo Gupta MD 20 mL/hr at 05/23/24 0638 New Bag at 05/23/24 0638   • acetaminophen (Tylenol Extra Strength) tab 1,000 mg  1,000 mg Oral Once Bernarod Gupta MD       • ceFAZolin (Ancef) 2g in 10mL IV syringe premix  2 g Intravenous Once Bernardo Gupta MD       • metRONIDAZOLE in sodium chloride 0.79% (Flagyl) 5 mg/mL IVPB premix 500 mg  500 mg Intravenous Once Bernardo Gupta MD         No  current Epic-ordered outpatient medications on file.       Allergies   Allergen Reactions   • Acetaminophen NAUSEA ONLY and SWELLING     Swelling in fingers and hands   • Prednisone OTHER (SEE COMMENTS)     Did not feel well after taking this medication       Family History   Problem Relation Age of Onset   • No Known Problems Father    • No Known Problems Mother      Social History     Socioeconomic History   • Marital status:    Tobacco Use   • Smoking status: Never   • Smokeless tobacco: Never   Vaping Use   • Vaping status: Never Used   Substance and Sexual Activity   • Alcohol use: Not Currently     Alcohol/week: 1.0 standard drink of alcohol     Types: 1 Glasses of wine per week     Comment: Rarely   • Drug use: Never       Available pre-op labs reviewed.  Lab Results   Component Value Date    WBC 9.3 05/21/2024    RBC 4.47 05/21/2024    HGB 9.1 (L) 05/21/2024    HCT 30.8 (L) 05/21/2024    MCV 68.9 (L) 05/21/2024    MCH 20.4 (L) 05/21/2024    MCHC 29.5 (L) 05/21/2024    RDW 20.5 (H) 05/21/2024    .0 05/21/2024     Lab Results   Component Value Date     05/21/2024    K 4.2 05/21/2024     05/21/2024    CO2 25.0 05/21/2024    BUN 17 05/21/2024    CREATSERUM 0.70 05/21/2024     (H) 05/21/2024    CA 9.7 05/21/2024          Vital Signs:  Body mass index is 27.12 kg/m².   height is 1.626 m (5' 4\") and weight is 71.7 kg (158 lb). Her oral temperature is 98 °F (36.7 °C). Her blood pressure is 132/55 and her pulse is 80. Her respiration is 16 and oxygen saturation is 93%.   Vitals:    05/18/24 1307 05/23/24 0558   BP:  132/55   Pulse:  80   Resp:  16   Temp:  98 °F (36.7 °C)   TempSrc:  Oral   SpO2:  93%   Weight: 74.8 kg (165 lb) 71.7 kg (158 lb)   Height: 1.626 m (5' 4\") 1.626 m (5' 4\")        Anesthesia Evaluation     Patient summary reviewed and Nursing notes reviewed    Airway   Mallampati: III  Dental - Dentition appears grossly intact     Pulmonary - negative ROS and normal exam    Cardiovascular - negative ROS  Exercise tolerance: good  (+) murmur    ECG reviewed  Rhythm: regular  Rate: normal    Neuro/Psych - negative ROS     GI/Hepatic/Renal    (+) PUD    Endo/Other    (+) blood dyscrasia (chronic anemia Hgb 9)    Comments: Patient s/p colonoscopy yesterday.  Completed bowel prep the day before  Abdominal      Other findings: Patient with multiple broken teeth that she reports happening after EGD 1 year ago.  Never went to a dentist after as well.  Teeth in poor condition, some blackened in appearance          Anesthesia Plan:   ASA:  2  Plan:   General  Monitors and Lines:   Additonal IV, BIS and A-line  Airway:  ETT and Video laryngoscope  Post-op Pain Management: IV analgesics  Informed Consent Plan and Risks Discussed With:  Patient and child/children  Use of Blood Products Discussed With:  Patient  Discussed plan with:  Attending      I have informed Marisol Maher and/or legal guardian or family member of the nature of the anesthetic plan, benefits, risks including possible dental damage if relevant, major complications, and any alternative forms of anesthetic management.   All of the patient's questions were answered to the best of my ability. The patient desires the anesthetic management as planned.  EDVIN KRAFT MD  5/23/2024 6:40 AM  Present on Admission:  **None**

## 2024-05-24 LAB
ANION GAP SERPL CALC-SCNC: 7 MMOL/L (ref 0–18)
BASOPHILS # BLD AUTO: 0.04 X10(3) UL (ref 0–0.2)
BASOPHILS NFR BLD AUTO: 0.4 %
BLOOD TYPE BARCODE: 5100
BUN BLD-MCNC: 13 MG/DL (ref 9–23)
BUN/CREAT SERPL: 17.3 (ref 10–20)
CALCIUM BLD-MCNC: 7.7 MG/DL (ref 8.7–10.4)
CHLORIDE SERPL-SCNC: 115 MMOL/L (ref 98–112)
CO2 SERPL-SCNC: 22 MMOL/L (ref 21–32)
CREAT BLD-MCNC: 0.75 MG/DL
DEPRECATED RDW RBC AUTO: 57.1 FL (ref 35.1–46.3)
EGFRCR SERPLBLD CKD-EPI 2021: 77 ML/MIN/1.73M2 (ref 60–?)
EOSINOPHIL # BLD AUTO: 0.01 X10(3) UL (ref 0–0.7)
EOSINOPHIL NFR BLD AUTO: 0.1 %
ERYTHROCYTE [DISTWIDTH] IN BLOOD BY AUTOMATED COUNT: 22.1 % (ref 11–15)
GLUCOSE BLD-MCNC: 108 MG/DL (ref 70–99)
GLUCOSE BLDC GLUCOMTR-MCNC: 138 MG/DL (ref 70–99)
HCT VFR BLD AUTO: 28.5 %
HGB BLD-MCNC: 9.2 G/DL
IMM GRANULOCYTES # BLD AUTO: 0.03 X10(3) UL (ref 0–1)
IMM GRANULOCYTES NFR BLD: 0.3 %
LYMPHOCYTES # BLD AUTO: 1.42 X10(3) UL (ref 1–4)
LYMPHOCYTES NFR BLD AUTO: 14.5 %
MAGNESIUM SERPL-MCNC: 2 MG/DL (ref 1.6–2.6)
MCH RBC QN AUTO: 23.6 PG (ref 26–34)
MCHC RBC AUTO-ENTMCNC: 32.3 G/DL (ref 31–37)
MCV RBC AUTO: 73.1 FL
MONOCYTES # BLD AUTO: 1.48 X10(3) UL (ref 0.1–1)
MONOCYTES NFR BLD AUTO: 15.2 %
NEUTROPHILS # BLD AUTO: 6.78 X10 (3) UL (ref 1.5–7.7)
NEUTROPHILS # BLD AUTO: 6.78 X10(3) UL (ref 1.5–7.7)
NEUTROPHILS NFR BLD AUTO: 69.5 %
OSMOLALITY SERPL CALC.SUM OF ELEC: 299 MOSM/KG (ref 275–295)
PHOSPHATE SERPL-MCNC: 3.4 MG/DL (ref 2.4–5.1)
PLATELET # BLD AUTO: 188 10(3)UL (ref 150–450)
PLATELET MORPHOLOGY: NORMAL
POTASSIUM SERPL-SCNC: 3.8 MMOL/L (ref 3.5–5.1)
RBC # BLD AUTO: 3.9 X10(6)UL
SODIUM SERPL-SCNC: 144 MMOL/L (ref 136–145)
UNIT VOLUME: 350 ML
WBC # BLD AUTO: 9.8 X10(3) UL (ref 4–11)

## 2024-05-24 PROCEDURE — 85025 COMPLETE CBC W/AUTO DIFF WBC: CPT | Performed by: COLON & RECTAL SURGERY

## 2024-05-24 PROCEDURE — 82962 GLUCOSE BLOOD TEST: CPT

## 2024-05-24 PROCEDURE — 84100 ASSAY OF PHOSPHORUS: CPT | Performed by: COLON & RECTAL SURGERY

## 2024-05-24 PROCEDURE — 97535 SELF CARE MNGMENT TRAINING: CPT

## 2024-05-24 PROCEDURE — 97165 OT EVAL LOW COMPLEX 30 MIN: CPT

## 2024-05-24 PROCEDURE — 97161 PT EVAL LOW COMPLEX 20 MIN: CPT

## 2024-05-24 PROCEDURE — 97530 THERAPEUTIC ACTIVITIES: CPT

## 2024-05-24 PROCEDURE — 94799 UNLISTED PULMONARY SVC/PX: CPT

## 2024-05-24 PROCEDURE — 83735 ASSAY OF MAGNESIUM: CPT | Performed by: COLON & RECTAL SURGERY

## 2024-05-24 PROCEDURE — 80048 BASIC METABOLIC PNL TOTAL CA: CPT | Performed by: COLON & RECTAL SURGERY

## 2024-05-24 RX ORDER — POTASSIUM CHLORIDE 20 MEQ/1
40 TABLET, EXTENDED RELEASE ORAL ONCE
Status: COMPLETED | OUTPATIENT
Start: 2024-05-24 | End: 2024-05-24

## 2024-05-24 NOTE — CM/SW NOTE
05/24/24 1300   CM/SW Screening   Referral Source    Information Source [facility] staff;Chart review;Nursing rounds   Patient's Current Mental Status at Time of Assessment Alert;Oriented   Patient's Home Environment House   Patient lives with Spouse/Significant other   Patient Status Prior to Admission   Independent with ADLs and Mobility Yes     CM self ref to case for dc planning    CM met with pt at bedside. Confirmed address on file and PCP.    Pt provided above info. Pt sts she is indep w/ ADLS however is legally blind so does not drive.  No DME in use.    Pt does not anticipate dc needs at this time. PT OT eval pending    4pm  Anticipated therapy need: Home with Home Healthcare   CM req DSC to send tent ref, f2f done    Will need choice when list is available    Plan  Pending progress    / to remain available for support and/or discharge planning.     Monet Lewis, RN    Ext [phone]

## 2024-05-24 NOTE — PROGRESS NOTES
[facility]  part of [facility]    Progress Note    Marisol Maher Patient Status:  Inpatient    1935 MRN [id number]   Location [facility] 4W/SW/SE Attending Bernardo Gupta MD   Hosp Day # 1 PCP Rodney Kim MD        Subjective:   Marisol Maher is a(n) 88 year old female     :POD#1 s/p laparoscopic sigmoid colectomy and takedown colovesical and colovaginal fistula    Adequate analgesia with Dilaudid  Tolerating small volume clears, no BM or flatus yet              Allergies/Medications:   Allergies:   Allergies   Allergen Reactions    Acetaminophen NAUSEA ONLY and SWELLING     Swelling in fingers and hands    Prednisone OTHER (SEE COMMENTS)     Did not feel well after taking this medication      [COMPLETED] potassium chloride (Klor-Con M20) tab 40 mEq  40 mEq Oral Once    lactated ringers infusion   Intravenous Continuous    [COMPLETED] heparin (Porcine) 5000 UNIT/ML injection 5,000 Units  5,000 Units Subcutaneous Once    [COMPLETED] ceFAZolin (Ancef) 2g in 10mL IV syringe premix  2 g Intravenous Once    [COMPLETED] metRONIDAZOLE in sodium chloride 0.79% (Flagyl) 5 mg/mL IVPB premix 500 mg  500 mg Intravenous Once    lactated ringers infusion  2 mL/kg/hr Intravenous Continuous    heparin (Porcine) 5000 UNIT/ML injection 5,000 Units  5,000 Units Subcutaneous Q8H ANDRA    polyethylene glycol (PEG 3350) (Miralax) 17 g oral packet 17 g  17 g Oral Daily PRN    sennosides (Senokot) tab 17.2 mg  17.2 mg Oral Nightly PRN    famotidine (Pepcid) tab 20 mg  20 mg Oral Daily    Or    famotidine (Pepcid) 20 mg/2mL injection 20 mg  20 mg Intravenous Daily    magnesium oxide (Mag-Ox) tab 400 mg  400 mg Oral Daily    oxyCODONE immediate release tab 2.5 mg  2.5 mg Oral Q4H PRN    Or    oxyCODONE immediate release tab 5 mg  5 mg Oral Q4H PRN    HYDROmorphone (Dilaudid) 1 MG/ML injection 0.2 mg  0.2 mg Intravenous Q2H PRN    Or    HYDROmorphone (Dilaudid) 1 MG/ML injection 0.4 mg  0.4 mg  Intravenous Q2H PRN    ondansetron (Zofran) 4 MG/2ML injection 4 mg  4 mg Intravenous Q6H PRN    metoclopramide (Reglan) 5 mg/mL injection 10 mg  10 mg Intravenous Q8H PRN    metRONIDAZOLE in sodium chloride 0.79% (Flagyl) 5 mg/mL IVPB premix 500 mg  500 mg Intravenous Q8H           Objective:   Vital Signs:  Blood pressure 130/52, pulse 78, temperature 98.3 °F (36.8 °C), temperature source Oral, resp. rate 18, height 5' 4\" (1.626 m), weight 158 lb (71.7 kg), SpO2 91%.     I/O last 3 completed shifts:  In: 9549 [I.V.:8299; Blood:700; IV PIGGYBACK:550]  Out: 1695 [Urine:1550; Drains:145]    General: No acute distress. Alert and oriented x 3.  HEENT: Moist mucous membranes. EOM-I. PERRL  Neck: No lymphadenopathy.  No JVD. No carotid bruits.  Respiratory: Clear to auscultation bilaterally.  No wheezes. No rhonchi.  Cardiovascular: S1, S2.  Regular rate and rhythm.  No murmurs. Equal pulses   Abdomen: Soft, appropriate incisional tenderness, nondistended.  Positive bowel sounds. No rebound tenderness  Incision(s): C/D/I x 6  Drain: [place]    Neurologic: No focal neurological deficits.  Musculoskeletal: Full range of motion of all extremities.  No swelling noted.  Integument: No lesions. No erythema.  Psychiatric: Appropriate mood and affect.        Assessment and Plan:     Diverticulitis with colovaginal and colovesical fistula  POD#1 s/p laparoscopic sigmoid colectomy and repair colovaginal and colovesical fistula  Progressing well  Continue ERAS  Ambulate  Inc antoine  Advance diet as tolerated  Maintain Vasquez at discharge and until 2 weeks postop, will need OP cystogram  Maintain Ever drain until after Vasquez removed            Results:     Lab Results   Component Value Date    WBC 9.8 05/24/2024    HGB 9.2 (L) 05/24/2024    HCT 28.5 (L) 05/24/2024    .0 05/24/2024    CREATSERUM 0.75 05/24/2024    BUN 13 05/24/2024     05/24/2024    K 3.8 05/24/2024     (H) 05/24/2024    CO2 22.0 05/24/2024    GLU  108 (H) 05/24/2024    CA 7.7 (L) 05/24/2024    MG 2.0 05/24/2024    PHOS 3.4 05/24/2024       No results found.               Bernardo Gupta MD  5/24/2024

## 2024-05-24 NOTE — PLAN OF CARE
Problem: Patient Centered Care  Goal: Patient preferences are identified and integrated in the patient's plan of care  Description: Interventions:  - What would you like us to know as we care for you? I love to read, I do not watch TV  - Provide timely, complete, and accurate information to patient/family  - Incorporate patient and family knowledge, values, beliefs, and cultural backgrounds into the planning and delivery of care  - Encourage patient/family to participate in care and decision-making at the level they choose  - Honor patient and family perspectives and choices  Outcome: Progressing     Problem: Patient/Family Goals  Goal: Patient/Family Long Term Goal  Description: Patient's Long Term Goal: Go home    Interventions:  -   - See additional Care Plan goals for specific interventions  Outcome: Progressing     Problem: Patient/Family Goals  Goal: Patient/Family Short Term Goal  Description: Patient's Short Term Goal: Pain control    Interventions:   -   - See additional Care Plan goals for specific interventions  Outcome: Progressing     Problem: PAIN - ADULT  Goal: Verbalizes/displays adequate comfort level or patient's stated pain goal  Description: INTERVENTIONS:  - Encourage pt to monitor pain and request assistance  - Assess pain using appropriate pain scale  - Administer analgesics based on type and severity of pain and evaluate response  - Implement non-pharmacological measures as appropriate and evaluate response  - Consider cultural and social influences on pain and pain management  - Manage/alleviate anxiety  - Utilize distraction and/or relaxation techniques  - Monitor for opioid side effects  - Notify MD/LIP if interventions unsuccessful or patient reports new pain  - Anticipate increased pain with activity and pre-medicate as appropriate  Outcome: Progressing     Problem: RISK FOR INFECTION - ADULT  Goal: Absence of fever/infection during anticipated neutropenic period  Description:  INTERVENTIONS  - Monitor WBC  - Administer growth factors as ordered  - Implement neutropenic guidelines  Outcome: Progressing     Problem: SAFETY ADULT - FALL  Goal: Free from fall injury  Description: INTERVENTIONS:  - Assess pt frequently for physical needs  - Identify cognitive and physical deficits and behaviors that affect risk of falls.  - [place]um fall precautions as indicated by assessment.  - Educate pt/family on patient safety including physical limitations  - Instruct pt to call for assistance with activity based on assessment  - Modify environment to reduce risk of injury  - Provide assistive devices as appropriate  - Consider OT/PT consult to assist with strengthening/mobility  - Encourage toileting schedule  Outcome: Progressing     Problem: DISCHARGE PLANNING  Goal: Discharge to home or other facility with appropriate resources  Description: INTERVENTIONS:  - Identify barriers to discharge w/pt and caregiver  - Include patient/family/discharge partner in discharge planning  - Arrange for needed discharge resources and transportation as appropriate  - Identify discharge learning needs (meds, wound care, etc)  - Arrange for interpreters to assist at discharge as needed  - Consider post-discharge preferences of patient/family/discharge partner  - Complete POLST form as appropriate  - Assess patient's ability to be responsible for managing their own health  - Refer to Case Management Department for coordinating discharge planning if the patient needs post-hospital services based on physician/LIP order or complex needs related to functional status, cognitive ability or social support system  Outcome: Progressing       Patient alert and oriented x4. Pain was managed with Dilaudid as needed. Room air. Surgical sites clean, dry and intact. SHANE drain in place, drained. Vasquez in place. Only tolerating ice chips and sips of water at the moment. Encouraged patient to ambulate but she wasn't up for it. Safety and  fall precautions in place, call light within reach.

## 2024-05-24 NOTE — PHYSICAL THERAPY NOTE
PHYSICAL THERAPY EVALUATION - INPATIENT     Room Number: 458/458-A  Evaluation Date: 5/24/2024  Type of Evaluation: Initial   Physician Order: PT Eval and Treat    Presenting Problem: colovaginal fistula s/p L colon resection  Co-Morbidities : UTI's, peptic ulcer, legally blind, anemia, RA  Reason for Therapy: Mobility Dysfunction and Discharge Planning    PHYSICAL THERAPY ASSESSMENT   Patient is a 88 year old female admitted 5/23/2024 for elective colon resection w/ureteral stents and need for cherry even at [place]. Prior to admission, patient's baseline is independent without a device and able to navigate stairs and manage home except for driving. Supportive daughter in the area can assist as needed but pt is fiercely independent.  Patient is currently functioning below baseline with bed mobility, transfers, gait, stair negotiation, maintaining seated position, standing prolonged periods, and performing household tasks.  Patient is requiring minimal assist as a result of the following impairments: pain and medical status.  Physical Therapy will continue to follow for duration of hospitalization.    Patient will benefit from continued skilled PT Services at discharge to promote functional independence and safety with additional support and return home with home health PT.    PLAN  PT Treatment Plan: Body mechanics;Bed mobility;Endurance;Patient education;Family education;Gait training;Strengthening;Stoop training;Stair training;Transfer training  Rehab Potential : Good  Frequency (Obs): Daily    PHYSICAL THERAPY MEDICAL/SOCIAL HISTORY   History related to current admission: Ms. Maher is an 89 yo F with PMH of diverticulosis and colovaginal fistula who presented for lap sigmoid resection.    - cherry to remain in place on discharge     Hx peptic ulcers  - PPI at home, is on H2B here     Microcytic anemia  - Hg 9.1 on preop labs  - monitor     Arthritis  - no home meds, worse in hands, worried about being able to  manipulate and care for cherry        Problem List  Active Problems:    Diverticulitis      HOME SITUATION  Home Situation  Type of Home: House  Home Layout: Two level;Bed/bath upstairs  Stairs to Enter : 1 (VERY LARGE stoop to enter)  Railing: No  Stairs to Bedroom: 16 (8+8 with railing)  Railing: Yes  Lives With: Spouse (pt helps care for spouse; supportive dtr in area)  Drives: No (due to low vision)  Patient Owned Equipment: None  Patient Regularly Uses: Glasses     Prior Level of [place]: Patient lives in a two story home with her . She uses [employer] for grocery delivery and they bring the groceries into the home. She is normally independent without any device at baseline and \"runs the stairs\" on a normal day. She does the cooking, cleaning and laundry, there is a cleaning person who comes 1-2 times a month for bigger cleaning. Patient does not drive due to poor vision.    SUBJECTIVE  \"I just hurt too much to walk right now. I don't know why the pain meds aren't helping!\"    PHYSICAL THERAPY EXAMINATION   OBJECTIVE  Precautions: Abdominal protective strategies;Bed/chair alarm;Drain(s) (IV, cherry)  Fall Risk: Standard fall risk    WEIGHT BEARING RESTRICTION  Weight Bearing Restriction: None                PAIN ASSESSMENT  Rating: Unable to rate  Location: upper abdomen/shoulder  Management Techniques: Activity promotion;Body mechanics;Breathing techniques;Repositioning;Other (Comment) (pt was medicated)    COGNITION  Overall Cognitive Status:  wfl but poor insight into medical status and need for mobility    RANGE OF MOTION AND STRENGTH ASSESSMENT  Upper extremity ROM and strength are within functional limits   Lower extremity ROM is within functional limits   Lower extremity strength is within functional limits     BALANCE  Static Sitting: Good  Dynamic Sitting: Fair +  Static Standing: Not tested  Dynamic Standing: Not tested    ACTIVITY TOLERANCE  Pulse: 78  Heart Rate Source: Monitor                    O2 WALK  Oxygen Therapy  SPO2% on Room Air at Rest: 93    AM-PAC '6-Clicks' INPATIENT SHORT FORM - BASIC MOBILITY  How much difficulty does the patient currently have...  Patient Difficulty: Turning over in bed (including adjusting bedclothes, sheets and blankets)?: A Little   Patient Difficulty: Sitting down on and standing up from a chair with arms (e.g., wheelchair, bedside commode, etc.): A Little   Patient Difficulty: Moving from lying on back to sitting on the side of the bed?: A Little   How much help from another person does the patient currently need...   Help from Another: Moving to and from a bed to a chair (including a wheelchair)?: A Little   Help from Another: Need to walk in hospital room?: A Little   Help from Another: Climbing 3-5 steps with a railing?: A Lot     AM-PAC Score:  Raw Score: 17   Approx Degree of Impairment: 50.57%   Standardized Score (AM-PAC Scale): 42.13   CMS Modifier (G-Code): CK    FUNCTIONAL ABILITY STATUS  Functional Mobility/Gait Assessment  Gait Assistance: Not tested (pt adamantly refusing to get up and walk even with maximal education)  Rolling: contact guard assist  Supine to Sit: contact guard assist  Sit to Supine: contact guard assist  Sit to Stand: NT, pt adamantly refusing to get out of bed    Exercise/Education Provided:  Bed mobility  Body mechanics  Functional activity tolerated  Patient and family education    RN approves participation in therapy session. Patient presents in bed and initially not agreeable to therapy but with coaxing is willing to move to EOB. She reports having some pains in her upper abdomen and shoulder that are not responding to pain meds/possibly gas pain postop, RN is aware. Patient reports she is moving well normally and \"runs around home\" all the time. She is educated about post op mobility and encouraged to sit up in chair later with RN staff and possibly walk as this will likely help with the gas pains.  She is able to move to EOB  with supervision to CGA and needs max A for scooting up in bed. Daughter in room during much of session and reports pt needs to be pushed a bit to do what is needed. Anticipate patient will progress to supervision to mod I level and be able to return home. She is returned to bed with all needs in reach and RN aware. Patient does have 1 large stoop she needs to be able to navigate to get into home.    The patient's Approx Degree of Impairment: 50.57% has been calculated based on documentation in the [facility] '6 clicks' Inpatient Basic Mobility Short Form.  Research supports that patients with this level of impairment may benefit from home w/HHPT and 24 hr care.  Final disposition will be made by interdisciplinary medical team.    Patient End of Session: In bed;Needs met;Call light within reach;RN aware of session/findings;All patient questions and concerns addressed;Alarm set;Family present    CURRENT GOALS  Goals to be met by: 6/5/24  Patient Goal Patient's self-stated goal is: to go home   Goal #1 Patient is able to demonstrate supine - sit EOB @ level: modified independent     Goal #1   Current Status    Goal #2 Patient is able to demonstrate transfers Sit to/from Stand at assistance level: modified independent with none     Goal #2  Current Status    Goal #3 Patient is able to ambulate 450 feet with assist device: walker - rolling at assistance level: supervision   Goal #3   Current Status    Goal #4 Patient will negotiate 8 stairs/one curb w/ assistive device and supervision   Goal #4   Current Status    Goal #5 Patient to demonstrate independence with home activity/exercise instructions provided to patient in preparation for discharge.   Goal #5   Current Status    Goal #6    Goal #6  Current Status      Patient Evaluation Complexity Level:  History High - 3 or more personal factors and/or co-morbidities   Examination of body systems Moderate - addressing a total of 3 or more elements   Clinical Presentation   Moderate - Evolving   Clinical Decision Making  Low Complexity     Therapeutic Activity:  16 minutes

## 2024-05-24 NOTE — CM/SW NOTE
Department  notified of request for HHC, aidin referrals started. Assigned CM/SW to follow up with pt/family on further discharge planning.     Liza Garland, DSC

## 2024-05-24 NOTE — OCCUPATIONAL THERAPY NOTE
OCCUPATIONAL THERAPY EVALUATION - INPATIENT     Room Number: 458/458-A  Evaluation Date: 5/24/2024  Type of Evaluation: Initial  Presenting Problem: s/p Cystoscopy, dual ureteral bilateral lighted stents (Shane), Proctoscopy, laparoscopic sigmoid resection    Physician Order: IP Consult to Occupational Therapy  Reason for Therapy: ADL/IADL Dysfunction and Discharge Planning    OCCUPATIONAL THERAPY ASSESSMENT   Patient is a 88 year old female admitted 5/23/2024 for Cystoscopy, dual ureteral bilateral lighted stents (Shane), Proctoscopy, laparoscopic sigmoid resection.  Prior to admission, patient's baseline is independent with ADLs , functional mobility, IADLs around the house.  Patient is currently functioning below baseline with ADLs and functional mobility.  Patient is requiring Min A - SBA as a result of the following impairments: pain, anticipation of pain, limited reach. Occupational Therapy will continue to follow for duration of hospitalization.    Patient will benefit from continued skilled OT Services at discharge to promote functional independence and safety with additional support and return home with home health OT    PLAN  OT Treatment Plan: Balance activities;Energy conservation/work simplification techniques;ADL training;Functional transfer training;Endurance training;Equipment eval/education;Compensatory technique education  OT Device Recommendations: TBD    OCCUPATIONAL THERAPY MEDICAL/SOCIAL HISTORY   Problem List  Active Problems:    Diverticulitis    HOME SITUATION  Type of Home: House  Home Layout: Two level; Bed/bath upstairs  Lives With: Spouse (pt helps care for spouse; supportive dtr in area)  Toilet and Equipment: Comfort height toilet  Shower/Tub and Equipment: Tub-shower combo; Grab bar  Drives: No (due to low vision)  Patient Regularly Uses: Glasses    SUBJECTIVE  \"If I do even 3 ankle pumps, my RA will flair up. I walk for exercise.\"     OCCUPATIONAL THERAPY EXAMINATION     OBJECTIVE  Precautions: Abdominal protective strategies; Bed/chair alarm; Drain(s) (IV, cherry)  Fall Risk: Standard fall risk    PAIN ASSESSMENT  Rating: Unable to rate  Location: abdomen (pt describing gas pains; pt was medicated for pain pre activity by rn)  Management Techniques: Activity promotion; Body mechanics; Repositioning; Nurse notified    ACTIVITY TOLERANCE  Pulse: 78                      O2 SATURATIONS  Oxygen Therapy  SPO2% on Room Air at Rest: 93    COGNITION  WFL    RANGE OF MOTION   Upper extremity ROM is within functional limits     STRENGTH ASSESSMENT  Upper extremity strength is within functional limits     COORDINATION  Gross Motor: WFL   Fine Motor: WFL     ACTIVITIES OF DAILY LIVING ASSESSMENT  AM-PAC ‘6-Clicks’ Inpatient Daily Activity Short Form  How much help from another person does the patient currently need…  -   Putting on and taking off regular lower body clothing?: A Lot  -   Bathing (including washing, rinsing, drying)?: A Little  -   Toileting, which includes using toilet, bedpan or urinal? : A Little  -   Putting on and taking off regular upper body clothing?: A Little  -   Taking care of personal grooming such as brushing teeth?: A Little  -   Eating meals?: A Little    AM-PAC Score:  Score: 17  Approx Degree of Impairment: 50.11%  Standardized Score (AM-PAC Scale): 37.26  CMS Modifier (G-Code): CK    FUNCTIONAL TRANSFER ASSESSMENT  Sit to Stand: Edge of Bed  Edge of Bed: Not Tested (pt declined, returned herself to bed suddenly -report of sudden pain)    BED MOBILITY  Supine to Sit : Minimal Assist  Sit to Supine (OT): Contact Guard Assist    BALANCE ASSESSMENT     FUNCTIONAL ADL ASSESSMENT  Eating: Independent (ice chips seated edge of bed)  Grooming Seated: Stand-by Assist    Skilled Therapy Provided: RN cleared pt for participation in occupational therapy session, which was completed in collaboration with physical therapy. Upon arrival, pt was supine in bed and agreeable to  activity. Pt's daughter was present and supporting patient during session. Pt benefited from additional time, verbal cues to maximize participation.    Max education on importance of mobility / activity post-op. Daughter also attempting to encourage patient. Pt eventually receptive to sit edge of bed for grooming, but then returned self to supine.      EDUCATION PROVIDED  Patient: Role of Occupational Therapy; Plan of Care; Functional Transfer Techniques; Surgical Precautions; Posture/Positioning; Compensatory ADL Techniques; Abdominal Protective Strategies  Patient's Response to Education: Verbalized Understanding; Returned Demonstration; Requires Further Education    The patient's Approx Degree of Impairment: 50.11% has been calculated based on documentation in the [facility] '6 clicks' Inpatient Daily Activity Short Form.  Research supports that patients with this level of impairment may benefit from rehab.  Final disposition will be made by interdisciplinary medical team.     Patient End of Session: In bed;Needs met;Call light within reach;RN aware of session/findings;Family present    OT Goals  Patients self stated goal is: home     Patient will complete functional transfer with Mod I  Comment:     Patient will complete toileting with Mod I  Comment:     Patient will tolerate standing for 4 minutes in prep for adls with Mod I   Comment:    Patient will complete item retrieval with Mod I  Comment:          Goals  on: 24  Frequency: 3-5x/w    Patient Evaluation Complexity Level:   Occupational Profile/Medical History LOW - Brief history including review of medical or therapy records    Specific performance deficits impacting engagement in ADL/IADL LOW  1 - 3 performance deficits    Client Assessment/Performance Deficits LOW - No comorbidities nor modifications of tasks    Clinical Decision Making LOW - Analysis of occupational profile, problem-focused assessments, limited treatment options    Overall  Complexity LOW     OT Session Time: 15 minutes  Self-Care Home Management: 15 minutes

## 2024-05-24 NOTE — H&P
[facility] Hospitalist H&P       CC: No chief complaint on file.       PCP: Rodney Kim MD    Date of Admission: 5/23/2024  5:50 AM    ASSESSMENT / PLAN:       Ms. Maher is an 87 yo F with PMH of diverticulosis and colovaginal fistula who presented for lap sigmoid resection.    Diverticular disease  Colovaginal fistula  Colovesical fistula  S/p lap sigmoid resection  - PRN pain meds, Transition to PO when able  - monitor for acute blood loss anemia, pre-op Hg 9.1  - Bowel reg, antiemetics  - DVT Prophy- HSQ  - PT/OT/SW   - as per surgery   - cherry to remain in place on discharge    Hx peptic ulcers  - PPI at home, is on H2B here    Microcytic anemia  - Hg 9.1 on preop labs  - monitor    Arthritis  - no home meds, worse in hands, worried about being able to manipulate and care for cherry    FN:  - IVF: NS  - Diet: CLD    DVT Prophy: SCD, HSQ  Lines: PIV, cherry    Dispo: pending clinical course    Outpatient records or previous hospital records reviewed.     Further recommendations pending patient's clinical course.  [facility] [place] continue to follow patient while in house    Patient and/or patient's family given opportunity to ask questions and note understanding and agreeing with therapeutic plan as outlined    Natalie Yin MD  [facility] [place]  Answering Service number: [phone]    HPI       History of Present Illness:     Ms. Maher is an 87 yo F with PMH of diverticulosis and colovaginal fistula who presented for lap sigmoid resection. Patient seen on POD#1, feels tired today, abdominal soreness. Feels bloated, no CP, SOB, N/V. No flatus yet. Lives at home with  who is in a wheelchair. Does have arthritis and worries about being able to care for her cherry.       PMH  Past Medical History:    Diverticulosis of large intestine    History of blood transfusion    2022, no reactions    History of stomach ulcers    Osteoarthritis    Visual impairment    Blind in one eye        PSH  Past Surgical  History:   Procedure Laterality Date    Appendectomy      Colonoscopy      Hysterectomy      Other surgical history  2024    Stomach ulcer embolization    Total knee replacement Bilateral     Upper gi endoscopy,exam          ALL:  Allergies   Allergen Reactions    Acetaminophen NAUSEA ONLY and SWELLING     Swelling in fingers and hands    Prednisone OTHER (SEE COMMENTS)     Did not feel well after taking this medication        Home Medications:  Outpatient Medications Marked as Taking for the 5/23/24 encounter (Hospital Encounter)   Medication Sig Dispense Refill    pantoprazole 40 MG Oral Tab EC Take 1 tablet (40 mg total) by mouth daily.           Soc Hx  Social History     Tobacco Use    Smoking status: Never    Smokeless tobacco: Never   Substance Use Topics    Alcohol use: Not Currently     Alcohol/week: 1.0 standard drink of alcohol     Types: 1 Glasses of wine per week     Comment: Rarely        Fam Hx  Family History   Problem Relation Age of Onset    No Known Problems Father     No Known Problems Mother        Review of Systems  Comprehensive ROS reviewed and negative except for what's stated above.     OBJECTIVE:  /36 (BP Location: Right arm)   Pulse 79   Temp 98 °F (36.7 °C) (Oral)   Resp 18   Ht 5' 4\" (1.626 m)   Wt 158 lb (71.7 kg)   SpO2 91%   BMI 27.12 kg/m²     GEN: elderly female in NAD   HEENT: EOMI  Pulm: CTAB, no crackles or wheezes  CV: RRR, no murmurs  ABD: Soft, mild TTP, mildly-distended, hypoactive BS  SKIN: warm, dry  EXT: no edema    Diagnostic Data:    CBC/Chem    Recent Labs   Lab 05/21/24  1013 05/23/24  1128 05/23/24  1424 05/24/24  0612   RBC 4.47  --   --  3.90   HGB 9.1* 7.4* 9.9* 9.2*   HCT 30.8* 24.5* 31.6* 28.5*   MCV 68.9*  --   --  73.1*   MCH 20.4*  --   --  23.6*   MCHC 29.5*  --   --  32.3   RDW 20.5*  --   --  22.1*   NEPRELIM 5.76  --   --  6.78   WBC 9.3  --   --  9.8   .0  --   --  188.0         Recent Labs   Lab 05/21/24  1013 05/24/24  0612   GLU  101* 108*   BUN 17 13   CREATSERUM 0.70 0.75   EGFRCR 84 77   CA 9.7 7.7*    144   K 4.2 3.8    115*   CO2 25.0 22.0     Lab Results   Component Value Date    WBC 9.8 05/24/2024    HGB 9.2 05/24/2024    HCT 28.5 05/24/2024    .0 05/24/2024    CREATSERUM 0.75 05/24/2024    BUN 13 05/24/2024     05/24/2024    K 3.8 05/24/2024     05/24/2024    CO2 22.0 05/24/2024     05/24/2024    CA 7.7 05/24/2024    MG 2.0 05/24/2024    PHOS 3.4 05/24/2024       No results for input(s): \"TROP\" in the last 168 hours.    Additional Diagnostics:     Radiology: No results found.

## 2024-05-24 NOTE — PLAN OF CARE
Problem: Patient Centered Care  Goal: Patient preferences are identified and integrated in the patient's plan of care  Description: Interventions:  - What would you like us to know as we care for you? I love to read, I do not watch TV  - Provide timely, complete, and accurate information to patient/family  - Incorporate patient and family knowledge, values, beliefs, and cultural backgrounds into the planning and delivery of care  - Encourage patient/family to participate in care and decision-making at the level they choose  - Honor patient and family perspectives and choices  Outcome: Progressing     Problem: Patient/Family Goals  Goal: Patient/Family Long Term Goal  Description: Patient's Long Term Goal: Go home    Interventions:  -   - See additional Care Plan goals for specific interventions  Outcome: Progressing  Goal: Patient/Family Short Term Goal  Description: Patient's Short Term Goal: Pain control    Interventions:   -   - See additional Care Plan goals for specific interventions  Outcome: Progressing     Problem: PAIN - ADULT  Goal: Verbalizes/displays adequate comfort level or patient's stated pain goal  Description: INTERVENTIONS:  - Encourage pt to monitor pain and request assistance  - Assess pain using appropriate pain scale  - Administer analgesics based on type and severity of pain and evaluate response  - Implement non-pharmacological measures as appropriate and evaluate response  - Consider cultural and social influences on pain and pain management  - Manage/alleviate anxiety  - Utilize distraction and/or relaxation techniques  - Monitor for opioid side effects  - Notify MD/LIP if interventions unsuccessful or patient reports new pain  - Anticipate increased pain with activity and pre-medicate as appropriate  Outcome: Progressing     Problem: RISK FOR INFECTION - ADULT  Goal: Absence of fever/infection during anticipated neutropenic period  Description: INTERVENTIONS  - Monitor WBC  - Administer  growth factors as ordered  - Implement neutropenic guidelines  Outcome: Progressing     Problem: SAFETY ADULT - FALL  Goal: Free from fall injury  Description: INTERVENTIONS:  - Assess pt frequently for physical needs  - Identify cognitive and physical deficits and behaviors that affect risk of falls.  - [place] fall precautions as indicated by assessment.  - Educate pt/family on patient safety including physical limitations  - Instruct pt to call for assistance with activity based on assessment  - Modify environment to reduce risk of injury  - Provide assistive devices as appropriate  - Consider OT/PT consult to assist with strengthening/mobility  - Encourage toileting schedule  Outcome: Progressing     Problem: DISCHARGE PLANNING  Goal: Discharge to home or other facility with appropriate resources  Description: INTERVENTIONS:  - Identify barriers to discharge w/pt and caregiver  - Include patient/family/discharge partner in discharge planning  - Arrange for needed discharge resources and transportation as appropriate  - Identify discharge learning needs (meds, wound care, etc)  - Arrange for interpreters to assist at discharge as needed  - Consider post-discharge preferences of patient/family/discharge partner  - Complete POLST form as appropriate  - Assess patient's ability to be responsible for managing their own health  - Refer to Case Management Department for coordinating discharge planning if the patient needs post-hospital services based on physician/LIP order or complex needs related to functional status, cognitive ability or social support system  Outcome: Progressing     Problem: GASTROINTESTINAL - ADULT  Goal: Minimal or absence of nausea and vomiting  Description: INTERVENTIONS:  - Maintain adequate hydration with IV or PO as ordered and tolerated  - Nasogastric tube to low intermittent suction as ordered  - Evaluate effectiveness of ordered antiemetic medications  - Provide nonpharmacologic comfort  measures as appropriate  - Advance diet as tolerated, if ordered  - Obtain nutritional consult as needed  - Evaluate fluid balance  Outcome: Progressing  Goal: Maintains or returns to baseline bowel function  Description: INTERVENTIONS:  - Assess bowel function  - Maintain adequate hydration with IV or PO as ordered and tolerated  - Evaluate effectiveness of GI medications  - Encourage mobilization and activity  - Obtain nutritional consult as needed  - Establish a toileting routine/schedule  - Consider collaborating with pharmacy to review patient's medication profile  Outcome: Progressing     Problem: SKIN/TISSUE INTEGRITY - ADULT  Goal: Incision(s), wounds(s) or drain site(s) healing without S/S of infection  Description: INTERVENTIONS:  - Assess and document risk factors for pressure ulcer development  - Assess and document skin integrity  - Assess and document dressing/incision, wound bed, drain sites and surrounding tissue  - Implement wound care per orders  - Initiate isolation precautions as appropriate  - Initiate Pressure Ulcer prevention bundle as indicated  Outcome: Progressing     Marisol arrived from PACU. Patient is alert and oriented x4. Room air. Nausea controlled with PRN Zofran. Tolerating some ice chips, but hesitant to have more r/t nausea. Surgical sites, clean, dry and intact. SHANE drain in place, drain care completed as ordered. Vasquez in place, output WNL. IVF. IV abx. Up with assist and walker. Safety measures in place, call light within reach, will continue to monitor.

## 2024-05-25 LAB
ANION GAP SERPL CALC-SCNC: 7 MMOL/L (ref 0–18)
BUN BLD-MCNC: 11 MG/DL (ref 9–23)
BUN/CREAT SERPL: 19.3 (ref 10–20)
C DIFF TOX B STL QL: NEGATIVE
CALCIUM BLD-MCNC: 8.2 MG/DL (ref 8.7–10.4)
CHLORIDE SERPL-SCNC: 110 MMOL/L (ref 98–112)
CO2 SERPL-SCNC: 22 MMOL/L (ref 21–32)
CREAT BLD-MCNC: 0.57 MG/DL
DEPRECATED RDW RBC AUTO: 61.3 FL (ref 35.1–46.3)
EGFRCR SERPLBLD CKD-EPI 2021: 88 ML/MIN/1.73M2 (ref 60–?)
ERYTHROCYTE [DISTWIDTH] IN BLOOD BY AUTOMATED COUNT: 23.2 % (ref 11–15)
GLUCOSE BLD-MCNC: 137 MG/DL (ref 70–99)
HCT VFR BLD AUTO: 27.9 %
HGB BLD-MCNC: 8.4 G/DL
MCH RBC QN AUTO: 22.2 PG (ref 26–34)
MCHC RBC AUTO-ENTMCNC: 30.1 G/DL (ref 31–37)
MCV RBC AUTO: 73.6 FL
OSMOLALITY SERPL CALC.SUM OF ELEC: 290 MOSM/KG (ref 275–295)
PLATELET # BLD AUTO: 203 10(3)UL (ref 150–450)
POTASSIUM SERPL-SCNC: 4.2 MMOL/L (ref 3.5–5.1)
POTASSIUM SERPL-SCNC: 4.2 MMOL/L (ref 3.5–5.1)
RBC # BLD AUTO: 3.79 X10(6)UL
SODIUM SERPL-SCNC: 139 MMOL/L (ref 136–145)
WBC # BLD AUTO: 11.4 X10(3) UL (ref 4–11)

## 2024-05-25 PROCEDURE — 97530 THERAPEUTIC ACTIVITIES: CPT

## 2024-05-25 PROCEDURE — 80048 BASIC METABOLIC PNL TOTAL CA: CPT | Performed by: HOSPITALIST

## 2024-05-25 PROCEDURE — 87493 C DIFF AMPLIFIED PROBE: CPT | Performed by: HOSPITALIST

## 2024-05-25 PROCEDURE — 85027 COMPLETE CBC AUTOMATED: CPT | Performed by: HOSPITALIST

## 2024-05-25 PROCEDURE — 97116 GAIT TRAINING THERAPY: CPT

## 2024-05-25 PROCEDURE — 84132 ASSAY OF SERUM POTASSIUM: CPT | Performed by: HOSPITALIST

## 2024-05-25 NOTE — PHYSICAL THERAPY NOTE
PHYSICAL THERAPY TREATMENT NOTE - INPATIENT     Room Number: 458/458-A       Presenting Problem: colovaginal fistula s/p L colon resection  Co-Morbidities : UTI's, peptic ulcer, legally blind    Problem List  Active Problems:    Diverticulitis      PHYSICAL THERAPY ASSESSMENT   Patient demonstrates fair progress this session, goals  remain in progress.    Patient continues to function below baseline with bed mobility, transfers, gait, stair negotiation, maintaining seated position, standing prolonged periods, and performing household tasks.  Contributing factors to remaining limitations include decreased functional strength, decreased endurance/aerobic capacity, pain, impaired standing balance, and decreased compliance/participation.  Next session anticipate patient to progress bed mobility, transfers, gait, stair negotiation, maintaining seated position, standing prolonged periods, and performing household tasks.  Physical Therapy will continue to follow patient for duration of hospitalization.    Patient continues to benefit from continued skilled PT services: at discharge to promote functional independence and safety with additional support and return home with home health PT.    PLAN  PT Treatment Plan: Body mechanics;Bed mobility;Endurance;Patient education;Family education;Gait training;Strengthening;Stoop training;Stair training;Transfer training  Frequency (Obs): Daily    SUBJECTIVE  \"I can't do any exercise because of my arthritis\"     OBJECTIVE  Precautions: Abdominal protective strategies;Bed/chair alarm;Drain(s) (IV, cherry)    PAIN ASSESSMENT   Rating: Unable to rate  Location: L side of abdomen  Management Techniques: Activity promotion;Body mechanics;Repositioning    BALANCE  Static Sitting: Good  Dynamic Sitting: Fair +  Static Standing: Fair  Dynamic Standing: Fair -    ACTIVITY TOLERANCE           BP: (!) 163/75  BP Location: Right arm  BP Method: Automatic  Patient Position: Sitting     O2 WALK        AM-PAC '6-Clicks' INPATIENT SHORT FORM - BASIC MOBILITY  How much difficulty does the patient currently have...  Patient Difficulty: Turning over in bed (including adjusting bedclothes, sheets and blankets)?: A Little   Patient Difficulty: Sitting down on and standing up from a chair with arms (e.g., wheelchair, bedside commode, etc.): A Little   Patient Difficulty: Moving from lying on back to sitting on the side of the bed?: A Little   How much help from another person does the patient currently need...   Help from Another: Moving to and from a bed to a chair (including a wheelchair)?: A Little   Help from Another: Need to walk in hospital room?: A Little   Help from Another: Climbing 3-5 steps with a railing?: A Lot     AM-PAC Score:  Raw Score: 17   Approx Degree of Impairment: 50.57%   Standardized Score (AM-PAC Scale): 42.13   CMS Modifier (G-Code): CK    FUNCTIONAL ABILITY STATUS  Functional Mobility/Gait Assessment  Gait Assistance: Contact guard assist;Minimum assistance  Distance (ft): 20ft  Assistive Device: Rolling walker  Pattern: Shuffle (decreased felton speed, guarded posture. CG for straight path, Min A with turns and navigating around obstables in path. Distance ambulated limited by fatigue and nausea)  Rolling: contact guard assist. Total assist in toiletting care. Supervision to maintain side lying position  Supine to Sit: contact guard assist>declined recommendation for log roll technique with supine<>sit. \"I'll show you how I do it.\" Sat EOB for 10 minutes requiring supervision to maintain midline balance.   Sit to Supine: contact guard assist  Sit to Stand: contact guard assist. Cues for appropriate UE positioning with transitional movements. Instruction on pacing upon standing prior to mobilization. CG to maintain static standing with bilateral UE support on RW.     Additional information: Patient received supine in bed. RN approved activity. Therapist educated pt on POC and  physiological benefits of mobilization. Patient agreeable to participate with max encouragement from PT and RN. Cues to promote pacing and energy conservation techniques. Primary complaint is fatigue, nausea, dizziness with transitional movements and pain in L side of abdomen. Vitals stable throughout. Despite therapist encouraging pt to sit up in chair, pt declined, 2* fatigue. Encouraged pt for OOB activity later this p.m. with assist from RN staff.     The patient's Approx Degree of Impairment: 50.57% has been calculated based on documentation in the [facility] '6 clicks' Inpatient Daily Activity Short Form.  Research supports that patients with this level of impairment may benefit from home with home PT.   Final disposition will be made by interdisciplinary medical team.      Patient End of Session: In bed;Needs met;Call light within reach;RN aware of session/findings;Alarm set;With  staff    CURRENT GOALS   Goals to be met by: 6/5/24  Patient Goal Patient's self-stated goal is: to go home   Goal #1 Patient is able to demonstrate supine - sit EOB @ level: modified independent      Goal #1   Current Status  CGA to roll and supine<>sit   Goal #2 Patient is able to demonstrate transfers Sit to/from Stand at assistance level: modified independent with none      Goal #2  Current Status  CGA with RW   Goal #3 Patient is able to ambulate 450 feet with assist device: walker - rolling at assistance level: supervision   Goal #3   Current Status  CG/Min A with RW 20ft   Goal #4 Patient will negotiate 8 stairs/one curb w/ assistive device and supervision   Goal #4   Current Status  NT   Goal #5 Patient to demonstrate independence with home activity/exercise instructions provided to patient in preparation for discharge.   Goal #5   Current Status  ongoing     Therapeutic Activity: 25 minutes

## 2024-05-25 NOTE — PROGRESS NOTES
[facility]  part of [facility]    Progress Note    Marisol Maher Patient Status:  Inpatient    1935 MRN [id number]   Location [facility] 4W/SW/SE Attending Bernardo Gupta MD   Hosp Day # 2 PCP Rodney Kim MD        Subjective:   Marisol Maher is a(n) 88 year old female     POD#2 s/p laparoscopic sigmoid colectomy and takedown colovesical and colovaginal fistula    Adequate analgesia with Dilaudid  Tolerating small volume clears, she has had one BM and is passing flatus. Nausea earlier is better, she thinks it is related to Dilaudid.              Allergies/Medications:   Allergies:   Allergies   Allergen Reactions    Acetaminophen NAUSEA ONLY and SWELLING     Swelling in fingers and hands    Prednisone OTHER (SEE COMMENTS)     Did not feel well after taking this medication      [COMPLETED] potassium chloride (Klor-Con M20) tab 40 mEq  40 mEq Oral Once    lactated ringers infusion   Intravenous Continuous    [COMPLETED] heparin (Porcine) 5000 UNIT/ML injection 5,000 Units  5,000 Units Subcutaneous Once    [COMPLETED] ceFAZolin (Ancef) 2g in 10mL IV syringe premix  2 g Intravenous Once    [COMPLETED] metRONIDAZOLE in sodium chloride 0.79% (Flagyl) 5 mg/mL IVPB premix 500 mg  500 mg Intravenous Once    lactated ringers infusion  2 mL/kg/hr Intravenous Continuous    heparin (Porcine) 5000 UNIT/ML injection 5,000 Units  5,000 Units Subcutaneous Q8H ANDRA    polyethylene glycol (PEG 3350) (Miralax) 17 g oral packet 17 g  17 g Oral Daily PRN    sennosides (Senokot) tab 17.2 mg  17.2 mg Oral Nightly PRN    famotidine (Pepcid) tab 20 mg  20 mg Oral Daily    Or    famotidine (Pepcid) 20 mg/2mL injection 20 mg  20 mg Intravenous Daily    magnesium oxide (Mag-Ox) tab 400 mg  400 mg Oral Daily    oxyCODONE immediate release tab 2.5 mg  2.5 mg Oral Q4H PRN    Or    oxyCODONE immediate release tab 5 mg  5 mg Oral Q4H PRN    HYDROmorphone (Dilaudid) 1 MG/ML injection 0.2 mg  0.2 mg  Intravenous Q2H PRN    Or    HYDROmorphone (Dilaudid) 1 MG/ML injection 0.4 mg  0.4 mg Intravenous Q2H PRN    ondansetron (Zofran) 4 MG/2ML injection 4 mg  4 mg Intravenous Q6H PRN    metoclopramide (Reglan) 5 mg/mL injection 10 mg  10 mg Intravenous Q8H PRN    [COMPLETED] metRONIDAZOLE in sodium chloride 0.79% (Flagyl) 5 mg/mL IVPB premix 500 mg  500 mg Intravenous Q8H           Objective:   Vital Signs:  Blood pressure (!) 163/75, pulse 82, temperature 98.1 °F (36.7 °C), temperature source Oral, resp. rate 18, height 5' 4\" (1.626 m), weight 158 lb (71.7 kg), SpO2 91%.     I/O last 3 completed shifts:  In: 2999 [I.V.:2899; IV PIGGYBACK:100]  Out: 1530 [Urine:1375; Drains:155]    General: No acute distress. Alert and oriented x 3.  HEENT: Moist mucous membranes. EOM-I. PERRL  Neck: No lymphadenopathy.  No JVD. No carotid bruits.  Respiratory: Clear to auscultation bilaterally.  No wheezes. No rhonchi.  Cardiovascular: S1, S2.  Regular rate and rhythm.  No murmurs. Equal pulses   Abdomen: Soft, appropriate incisional tenderness, nondistended.  Positive bowel sounds. No rebound tenderness  Incision(s): C/D/I x 6  Drain: Serosang  Vasquez: urine clear    Neurologic: No focal neurological deficits.  Musculoskeletal: Full range of motion of all extremities.  No swelling noted.  Integument: No lesions. No erythema.  Psychiatric: Appropriate mood and affect.        Assessment and Plan:     Diverticulitis with colovaginal and colovesical fistula  POD#2 s/p laparoscopic sigmoid colectomy and repair colovaginal and colovesical fistula  Progressing well  Continue ERAS  Ambulate  Inc antoine  Advance diet as tolerated  Maintain Vasquez at discharge and until 2 weeks postop, will need OP cystogram  Maintain Ever drain until after Vasquez removed            Results:     Lab Results   Component Value Date    WBC 11.4 (H) 05/25/2024    HGB 8.4 (L) 05/25/2024    HCT 27.9 (L) 05/25/2024    .0 05/25/2024    CREATSERUM 0.57 05/25/2024     BUN 11 05/25/2024     05/25/2024    K 4.2 05/25/2024    K 4.2 05/25/2024     05/25/2024    CO2 22.0 05/25/2024     (H) 05/25/2024    CA 8.2 (L) 05/25/2024    MG 2.0 05/24/2024    PHOS 3.4 05/24/2024       No results found.               Bernardo Gupta MD  5/25/2024

## 2024-05-25 NOTE — PLAN OF CARE
No acute changes over night. Pt is alert and oriented on RA. Cherry in place. Pt will be discharged with the cherry. Pt has not passed gas or had a BM. Pain being managed with IV dilaudid. IVF running as ordered. SHANE drain managed as ordered. PT and OT to work with pt. Call light is within reach and safety measures are in place.    Problem: PAIN - ADULT  Goal: Verbalizes/displays adequate comfort level or patient's stated pain goal  Description: INTERVENTIONS:  - Encourage pt to monitor pain and request assistance  - Assess pain using appropriate pain scale  - Administer analgesics based on type and severity of pain and evaluate response  - Implement non-pharmacological measures as appropriate and evaluate response  - Consider cultural and social influences on pain and pain management  - Manage/alleviate anxiety  - Utilize distraction and/or relaxation techniques  - Monitor for opioid side effects  - Notify MD/LIP if interventions unsuccessful or patient reports new pain  - Anticipate increased pain with activity and pre-medicate as appropriate  Outcome: Progressing     Problem: RISK FOR INFECTION - ADULT  Goal: Absence of fever/infection during anticipated neutropenic period  Description: INTERVENTIONS  - Monitor WBC  - Administer growth factors as ordered  - Implement neutropenic guidelines  Outcome: Progressing     Problem: SAFETY ADULT - FALL  Goal: Free from fall injury  Description: INTERVENTIONS:  - Assess pt frequently for physical needs  - Identify cognitive and physical deficits and behaviors that affect risk of falls.  - [place] fall precautions as indicated by assessment.  - Educate pt/family on patient safety including physical limitations  - Instruct pt to call for assistance with activity based on assessment  - Modify environment to reduce risk of injury  - Provide assistive devices as appropriate  - Consider OT/PT consult to assist with strengthening/mobility  - Encourage toileting schedule  Outcome:  Progressing     Problem: DISCHARGE PLANNING  Goal: Discharge to home or other facility with appropriate resources  Description: INTERVENTIONS:  - Identify barriers to discharge w/pt and caregiver  - Include patient/family/discharge partner in discharge planning  - Arrange for needed discharge resources and transportation as appropriate  - Identify discharge learning needs (meds, wound care, etc)  - Arrange for interpreters to assist at discharge as needed  - Consider post-discharge preferences of patient/family/discharge partner  - Complete POLST form as appropriate  - Assess patient's ability to be responsible for managing their own health  - Refer to Case Management Department for coordinating discharge planning if the patient needs post-hospital services based on physician/LIP order or complex needs related to functional status, cognitive ability or social support system  Outcome: Progressing     Problem: GASTROINTESTINAL - ADULT  Goal: Minimal or absence of nausea and vomiting  Description: INTERVENTIONS:  - Maintain adequate hydration with IV or PO as ordered and tolerated  - Nasogastric tube to low intermittent suction as ordered  - Evaluate effectiveness of ordered antiemetic medications  - Provide nonpharmacologic comfort measures as appropriate  - Advance diet as tolerated, if ordered  - Obtain nutritional consult as needed  - Evaluate fluid balance  Outcome: Progressing  Goal: Maintains or returns to baseline bowel function  Description: INTERVENTIONS:  - Assess bowel function  - Maintain adequate hydration with IV or PO as ordered and tolerated  - Evaluate effectiveness of GI medications  - Encourage mobilization and activity  - Obtain nutritional consult as needed  - Establish a toileting routine/schedule  - Consider collaborating with pharmacy to review patient's medication profile  Outcome: Progressing     Problem: SKIN/TISSUE INTEGRITY - ADULT  Goal: Incision(s), wounds(s) or drain site(s) healing  without S/S of infection  Description: INTERVENTIONS:  - Assess and document risk factors for pressure ulcer development  - Assess and document skin integrity  - Assess and document dressing/incision, wound bed, drain sites and surrounding tissue  - Implement wound care per orders  - Initiate isolation precautions as appropriate  - Initiate Pressure Ulcer prevention bundle as indicated  Outcome: Progressing

## 2024-05-25 NOTE — PROGRESS NOTES
[facility] Hospitalist Progress Note     CC: Hospital Follow up    PCP: Rodney Kim MD       Assessment/Plan:     Active Problems:    Diverticulitis         Ms. Maher is an 87 yo F with PMH of diverticulosis and colovaginal fistula who presented for lap sigmoid resection.    Diverticular disease  Colovaginal fistula  Colovesical fistula  S/p lap sigmoid resection  - PRN pain meds, Transition to PO when able  - monitor for acute blood loss anemia, pre-op Hg 9.1  - Bowel reg, antiemetics  - DVT Prophy- HSQ  - PT/OT/SW   - as per surgery   - cherry to remain in place on discharge     Hx peptic ulcers  - PPI at home, is on H2B here     Microcytic anemia  - Hg 9.1 on preop labs  - monitor     Arthritis  - no home meds, worse in hands, worried about being able to manipulate and care for cherry     FN:  - IVF: NS  - Diet: CLD     DVT Prophy: SCD, HSQ  Lines: PIV, cherry     Dispo: pending clinical course     Outpatient records or previous hospital records reviewed.      Further recommendations pending patient's clinical course.  [facility] hospitalist to continue to follow patient while in house     Patient and/or patient's family given opportunity to ask questions and note understanding and agreeing with therapeutic plan as outlined     Natalie Yin MD  [facility] Hospitalist  Answering Service number: [phone]     Subjective:     Feels tired today. Slept well. Nauseated, no vomiting. +flatus today    OBJECTIVE:    Blood pressure 148/64, pulse 82, temperature 98.1 °F (36.7 °C), temperature source Oral, resp. rate 18, height 5' 4\" (1.626 m), weight 158 lb (71.7 kg), SpO2 91%.    Temp:  [98 °F (36.7 °C)-98.3 °F (36.8 °C)] 98.1 °F (36.7 °C)  Pulse:  [78-87] 82  Resp:  [16-18] 18  BP: (148-159)/(64-65) 148/64  SpO2:  [91 %-94 %] 91 %      Intake/Output:    Intake/Output Summary (Last 24 hours) at 5/25/2024 1222  Last data filed at 5/25/2024 1100  Gross per 24 hour   Intake 1100 ml   Output 1155 ml   Net -55 ml       Last 3 Weights    05/23/24 0558 158 lb (71.7 kg)   05/18/24 1307 165 lb (74.8 kg)       Exam   GEN: elderly female in NAD   HEENT: EOMI  Pulm: CTAB, no crackles or wheezes  CV: RRR, no murmurs  ABD: Soft, mild TTP, mildly-distended, hypoactive BS  SKIN: warm, dry  EXT: no edema      Data Review:       Labs:     Recent Labs   Lab 05/21/24  1013 05/23/24  1128 05/23/24  1424 05/24/24  0612 05/25/24  1033   RBC 4.47  --   --  3.90 3.79*   HGB 9.1*   < > 9.9* 9.2* 8.4*   HCT 30.8*   < > 31.6* 28.5* 27.9*   MCV 68.9*  --   --  73.1* 73.6*   MCH 20.4*  --   --  23.6* 22.2*   MCHC 29.5*  --   --  32.3 30.1*   RDW 20.5*  --   --  22.1* 23.2*   NEPRELIM 5.76  --   --  6.78  --    WBC 9.3  --   --  9.8 11.4*   .0  --   --  188.0 203.0    < > = values in this interval not displayed.         Recent Labs   Lab 05/21/24  1013 05/24/24  0612 05/25/24  1033   * 108* 137*   BUN 17 13 11   CREATSERUM 0.70 0.75 0.57   EGFRCR 84 77 88   CA 9.7 7.7* 8.2*    144 139   K 4.2 3.8 4.2  4.2    115* 110   CO2 25.0 22.0 22.0       No results for input(s): \"ALT\", \"AST\", \"ALB\", \"AMYLASE\", \"LIPASE\", \"LDH\" in the last 168 hours.    Invalid input(s): \"ALPHOS\", \"TBIL\", \"DBIL\", \"TPROT\"      Imaging:  No results found.      Meds:     INPATIENT MEDICATIONS    Scheduled Medications:      heparin, 5,000 Units, Q8H ANDRA  famotidine, 20 mg, Daily   Or  famotidine, 20 mg, Daily  magnesium oxide, 400 mg, Daily            Drips:  lactated ringers, Last Rate: Stopped (05/23/24 1108)  lactated ringers, Last Rate: 2 mL/kg/hr (05/24/24 2148)        PRN Medications  polyethylene glycol (PEG 3350), 17 g, Daily PRN  sennosides, 17.2 mg, Nightly PRN  oxyCODONE, 2.5 mg, Q4H PRN   Or  oxyCODONE, 5 mg, Q4H PRN  HYDROmorphone, 0.2 mg, Q2H PRN   Or  HYDROmorphone, 0.4 mg, Q2H PRN  ondansetron, 4 mg, Q6H PRN  metoclopramide, 10 mg, Q8H PRN

## 2024-05-26 LAB
DEPRECATED RDW RBC AUTO: 68.9 FL (ref 35.1–46.3)
ERYTHROCYTE [DISTWIDTH] IN BLOOD BY AUTOMATED COUNT: 24.4 % (ref 11–15)
HCT VFR BLD AUTO: 31.1 %
HGB BLD-MCNC: 8.9 G/DL
MCH RBC QN AUTO: 22.8 PG (ref 26–34)
MCHC RBC AUTO-ENTMCNC: 28.6 G/DL (ref 31–37)
MCV RBC AUTO: 79.7 FL
PLATELET # BLD AUTO: 105 10(3)UL (ref 150–450)
PLATELETS.RETICULATED NFR BLD AUTO: 3.9 % (ref 0–7)
RBC # BLD AUTO: 3.9 X10(6)UL
WBC # BLD AUTO: 6.2 X10(3) UL (ref 4–11)

## 2024-05-26 PROCEDURE — 85027 COMPLETE CBC AUTOMATED: CPT | Performed by: HOSPITALIST

## 2024-05-26 NOTE — PLAN OF CARE
Marisol is alert/oriented.   Vitals stable. Lap sites clean/dry/intact. Tolerating soft diet. Zofran x1 for nausea, resolved.   Ambulating in room with PT with much encouragement.   IVF infusing. Pain control with dilaudid prn. Heparin for DVT prophylaxis.   Voiding adequately with cherry. Passing as, BM x3 today, incontinent. Cdiff negative.   SHANE drain with serosang output.  Plan of care reviewed with pt. Bed low, locked, all safety measures in place.    Problem: Patient Centered Care  Goal: Patient preferences are identified and integrated in the patient's plan of care  Description: Interventions:  - What would you like us to know as we care for you? I love to read, I do not watch TV  - Provide timely, complete, and accurate information to patient/family  - Incorporate patient and family knowledge, values, beliefs, and cultural backgrounds into the planning and delivery of care  - Encourage patient/family to participate in care and decision-making at the level they choose  - Honor patient and family perspectives and choices  Outcome: Progressing     Problem: Patient/Family Goals  Goal: Patient/Family Long Term Goal  Description: Patient's Long Term Goal: Go home    Interventions:  -   - See additional Care Plan goals for specific interventions  Outcome: Progressing  Goal: Patient/Family Short Term Goal  Description: Patient's Short Term Goal: Pain control    Interventions:   -   - See additional Care Plan goals for specific interventions  Outcome: Progressing     Problem: PAIN - ADULT  Goal: Verbalizes/displays adequate comfort level or patient's stated pain goal  Description: INTERVENTIONS:  - Encourage pt to monitor pain and request assistance  - Assess pain using appropriate pain scale  - Administer analgesics based on type and severity of pain and evaluate response  - Implement non-pharmacological measures as appropriate and evaluate response  - Consider cultural and social influences on pain and pain  management  - Manage/alleviate anxiety  - Utilize distraction and/or relaxation techniques  - Monitor for opioid side effects  - Notify MD/LIP if interventions unsuccessful or patient reports new pain  - Anticipate increased pain with activity and pre-medicate as appropriate  Outcome: Progressing     Problem: RISK FOR INFECTION - ADULT  Goal: Absence of fever/infection during anticipated neutropenic period  Description: INTERVENTIONS  - Monitor WBC  - Administer growth factors as ordered  - Implement neutropenic guidelines  Outcome: Progressing     Problem: SAFETY ADULT - FALL  Goal: Free from fall injury  Description: INTERVENTIONS:  - Assess pt frequently for physical needs  - Identify cognitive and physical deficits and behaviors that affect risk of falls.  - [place] fall precautions as indicated by assessment.  - Educate pt/family on patient safety including physical limitations  - Instruct pt to call for assistance with activity based on assessment  - Modify environment to reduce risk of injury  - Provide assistive devices as appropriate  - Consider OT/PT consult to assist with strengthening/mobility  - Encourage toileting schedule  Outcome: Progressing     Problem: DISCHARGE PLANNING  Goal: Discharge to home or other facility with appropriate resources  Description: INTERVENTIONS:  - Identify barriers to discharge w/pt and caregiver  - Include patient/family/discharge partner in discharge planning  - Arrange for needed discharge resources and transportation as appropriate  - Identify discharge learning needs (meds, wound care, etc)  - Arrange for interpreters to assist at discharge as needed  - Consider post-discharge preferences of patient/family/discharge partner  - Complete POLST form as appropriate  - Assess patient's ability to be responsible for managing their own health  - Refer to Case Management Department for coordinating discharge planning if the patient needs post-hospital services based on  physician/LIP order or complex needs related to functional status, cognitive ability or social support system  Outcome: Progressing     Problem: GASTROINTESTINAL - ADULT  Goal: Minimal or absence of nausea and vomiting  Description: INTERVENTIONS:  - Maintain adequate hydration with IV or PO as ordered and tolerated  - Nasogastric tube to low intermittent suction as ordered  - Evaluate effectiveness of ordered antiemetic medications  - Provide nonpharmacologic comfort measures as appropriate  - Advance diet as tolerated, if ordered  - Obtain nutritional consult as needed  - Evaluate fluid balance  Outcome: Progressing  Goal: Maintains or returns to baseline bowel function  Description: INTERVENTIONS:  - Assess bowel function  - Maintain adequate hydration with IV or PO as ordered and tolerated  - Evaluate effectiveness of GI medications  - Encourage mobilization and activity  - Obtain nutritional consult as needed  - Establish a toileting routine/schedule  - Consider collaborating with pharmacy to review patient's medication profile  Outcome: Progressing     Problem: SKIN/TISSUE INTEGRITY - ADULT  Goal: Incision(s), wounds(s) or drain site(s) healing without S/S of infection  Description: INTERVENTIONS:  - Assess and document risk factors for pressure ulcer development  - Assess and document skin integrity  - Assess and document dressing/incision, wound bed, drain sites and surrounding tissue  - Implement wound care per orders  - Initiate isolation precautions as appropriate  - Initiate Pressure Ulcer prevention bundle as indicated  Outcome: Progressing

## 2024-05-26 NOTE — PROGRESS NOTES
[facility]  part of [facility]    Progress Note    Marisol Maher Patient Status:  Inpatient    1935 MRN [id number]   Location [facility] 4W/SW/SE Attending Bernardo Gupta MD   Hosp Day # 3 PCP Rodney Kim MD        Subjective:   Marisol Maher is a(n) 88 year old female     POD#3 s/p laparoscopic sigmoid colectomy and takedown colovesical and colovaginal fistula    Adequate analgesia, no longer using Dilaudid  Tolerating low residue diet, she has had several loose BMs and is passing flatus. Nausea earlier is absent.              Allergies/Medications:   Allergies:   Allergies   Allergen Reactions    Acetaminophen NAUSEA ONLY and SWELLING     Swelling in fingers and hands    Prednisone OTHER (SEE COMMENTS)     Did not feel well after taking this medication      [COMPLETED] potassium chloride (Klor-Con M20) tab 40 mEq  40 mEq Oral Once    lactated ringers infusion   Intravenous Continuous    [COMPLETED] heparin (Porcine) 5000 UNIT/ML injection 5,000 Units  5,000 Units Subcutaneous Once    [COMPLETED] ceFAZolin (Ancef) 2g in 10mL IV syringe premix  2 g Intravenous Once    [COMPLETED] metRONIDAZOLE in sodium chloride 0.79% (Flagyl) 5 mg/mL IVPB premix 500 mg  500 mg Intravenous Once    heparin (Porcine) 5000 UNIT/ML injection 5,000 Units  5,000 Units Subcutaneous Q8H ANDRA    polyethylene glycol (PEG 3350) (Miralax) 17 g oral packet 17 g  17 g Oral Daily PRN    sennosides (Senokot) tab 17.2 mg  17.2 mg Oral Nightly PRN    famotidine (Pepcid) tab 20 mg  20 mg Oral Daily    Or    famotidine (Pepcid) 20 mg/2mL injection 20 mg  20 mg Intravenous Daily    magnesium oxide (Mag-Ox) tab 400 mg  400 mg Oral Daily    oxyCODONE immediate release tab 2.5 mg  2.5 mg Oral Q4H PRN    Or    oxyCODONE immediate release tab 5 mg  5 mg Oral Q4H PRN    HYDROmorphone (Dilaudid) 1 MG/ML injection 0.2 mg  0.2 mg Intravenous Q2H PRN    Or    HYDROmorphone (Dilaudid) 1 MG/ML injection 0.4 mg  0.4 mg  Intravenous Q2H PRN    ondansetron (Zofran) 4 MG/2ML injection 4 mg  4 mg Intravenous Q6H PRN    metoclopramide (Reglan) 5 mg/mL injection 10 mg  10 mg Intravenous Q8H PRN    [COMPLETED] metRONIDAZOLE in sodium chloride 0.79% (Flagyl) 5 mg/mL IVPB premix 500 mg  500 mg Intravenous Q8H           Objective:   Vital Signs:  Blood pressure 128/46, pulse 86, temperature 98.5 °F (36.9 °C), temperature source Oral, resp. rate 18, height 5' 4\" (1.626 m), weight 158 lb (71.7 kg), SpO2 93%.     I/O last 3 completed shifts:  In: 1100 [I.V.:1100]  Out: 1180 [Urine:1050; Drains:130]    General: No acute distress. Alert and oriented x 3.  HEENT: Moist mucous membranes. EOM-I. PERRL  Neck: No lymphadenopathy.  No JVD. No carotid bruits.  Respiratory: Clear to auscultation bilaterally.  No wheezes. No rhonchi.  Cardiovascular: S1, S2.  Regular rate and rhythm.  No murmurs. Equal pulses   Abdomen: Soft, appropriate incisional tenderness, nondistended.  Positive bowel sounds. No rebound tenderness  Incision(s): C/D/I x 6  Drain: Serosang  Vasquez: urine clear    Neurologic: No focal neurological deficits.  Musculoskeletal: Full range of motion of all extremities.  No swelling noted.  Integument: No lesions. No erythema.  Psychiatric: Appropriate mood and affect.        Assessment and Plan:     Diverticulitis with colovaginal and colovesical fistula  POD#3 s/p laparoscopic sigmoid colectomy and repair colovaginal and colovesical fistula  Progressing well  Continue ERAS  Ambulate  Inc antoine  Advance diet as tolerated  Maintain Vasquez at discharge and until 2 weeks postop, will need OP cystogram  Maintain Ever drain until after Vasquez removed            Results:     Lab Results   Component Value Date    WBC 6.2 05/26/2024    HGB 8.9 (L) 05/26/2024    HCT 31.1 (L) 05/26/2024    .0 (L) 05/26/2024    CREATSERUM 0.57 05/25/2024    BUN 11 05/25/2024     05/25/2024    K 4.2 05/25/2024    K 4.2 05/25/2024     05/25/2024    CO2  22.0 05/25/2024     (H) 05/25/2024    CA 8.2 (L) 05/25/2024    MG 2.0 05/24/2024    PHOS 3.4 05/24/2024       No results found.               Bernardo Gupta MD  5/26/2024

## 2024-05-26 NOTE — PROGRESS NOTES
[facility] Hospitalist Progress Note     CC: Hospital Follow up    PCP: Rodney Kim MD       Assessment/Plan:     Active Problems:    Diverticulitis    Ms. Maher is an 89 yo F with PMH of diverticulosis and colovaginal fistula who presented for lap sigmoid resection.    Diverticular disease  Colovaginal fistula  Colovesical fistula  S/p lap sigmoid resection  - PRN pain meds, Transition to PO when able  - monitor for acute blood loss anemia, pre-op Hg 9.1  - Bowel reg, antiemetics  - DVT Prophy- HSQ  - PT/OT/SW   - as per surgery   - cherry to remain in place on discharge     Hx peptic ulcers  - PPI at home, is on H2B here     Microcytic anemia  - Hg 9.1 on preop labs  - monitor     Arthritis  - no home meds, worse in hands, worried about being able to manipulate and care for cherry     FN:  - IVF: none  - Diet: ADAT     DVT Prophy: SCD, HSQ  Lines: PIV, cherry     Dispo: pending clinical course     Outpatient records or previous hospital records reviewed.      Further recommendations pending patient's clinical course.  [facility] hospitalist to continue to follow patient while in house     Patient and/or patient's family given opportunity to ask questions and note understanding and agreeing with therapeutic plan as outlined     Natalie Yin MD  [facility] Hospitalist  Answering Service number: [phone]     Subjective:     Had a lot of diarrhea overnight, seems to have stopped this AM. Did eat more for breakfast today. Mild discomfort, did not use IV pain meds overnight.     OBJECTIVE:    Blood pressure 126/57, pulse 77, temperature 97.9 °F (36.6 °C), temperature source Oral, resp. rate 18, height 5' 4\" (1.626 m), weight 158 lb (71.7 kg), SpO2 91%.    Temp:  [97.9 °F (36.6 °C)-98.5 °F (36.9 °C)] 97.9 °F (36.6 °C)  Pulse:  [77-85] 77  Resp:  [18] 18  BP: (126-163)/(57-75) 126/57  SpO2:  [90 %-91 %] 91 %      Intake/Output:    Intake/Output Summary (Last 24 hours) at 5/26/2024 1152  Last data filed at 5/26/2024 0979  Gross  per 24 hour   Intake --   Output 555 ml   Net -555 ml       Last 3 Weights   05/23/24 0558 158 lb (71.7 kg)   05/18/24 1307 165 lb (74.8 kg)       Exam   GEN: elderly female in NAD   HEENT: EOMI  Pulm: CTAB, no crackles or wheezes  CV: RRR, no murmurs  ABD: Soft, mild TTP, mildly-distended, + BS  SKIN: warm, dry  EXT: no edema      Data Review:       Labs:     Recent Labs   Lab 05/21/24  1013 05/23/24  1128 05/24/24  0612 05/25/24  1033 05/26/24  0707   RBC 4.47  --  3.90 3.79* 3.90   HGB 9.1*   < > 9.2* 8.4* 8.9*   HCT 30.8*   < > 28.5* 27.9* 31.1*   MCV 68.9*  --  73.1* 73.6* 79.7*   MCH 20.4*  --  23.6* 22.2* 22.8*   MCHC 29.5*  --  32.3 30.1* 28.6*   RDW 20.5*  --  22.1* 23.2* 24.4*   NEPRELIM 5.76  --  6.78  --   --    WBC 9.3  --  9.8 11.4* 6.2   .0  --  188.0 203.0 105.0*    < > = values in this interval not displayed.         Recent Labs   Lab 05/21/24  1013 05/24/24  0612 05/25/24  1033   * 108* 137*   BUN 17 13 11   CREATSERUM 0.70 0.75 0.57   EGFRCR 84 77 88   CA 9.7 7.7* 8.2*    144 139   K 4.2 3.8 4.2  4.2    115* 110   CO2 25.0 22.0 22.0       No results for input(s): \"ALT\", \"AST\", \"ALB\", \"AMYLASE\", \"LIPASE\", \"LDH\" in the last 168 hours.    Invalid input(s): \"ALPHOS\", \"TBIL\", \"DBIL\", \"TPROT\"      Imaging:  No results found.      Meds:     INPATIENT MEDICATIONS    Scheduled Medications:      heparin, 5,000 Units, Q8H ANDRA  famotidine, 20 mg, Daily   Or  famotidine, 20 mg, Daily  magnesium oxide, 400 mg, Daily            Drips:  lactated ringers, Last Rate: Stopped (05/23/24 1108)  lactated ringers, Last Rate: 2 mL/kg/hr (05/25/24 1658)        PRN Medications  polyethylene glycol (PEG 3350), 17 g, Daily PRN  sennosides, 17.2 mg, Nightly PRN  oxyCODONE, 2.5 mg, Q4H PRN   Or  oxyCODONE, 5 mg, Q4H PRN  HYDROmorphone, 0.2 mg, Q2H PRN   Or  HYDROmorphone, 0.4 mg, Q2H PRN  ondansetron, 4 mg, Q6H PRN  metoclopramide, 10 mg, Q8H PRN

## 2024-05-26 NOTE — PLAN OF CARE
Patient alert and oriented x4. Up with standby assist with walker. Low fiber soft diet, tolerating well. Vasquez in place draining freely. SHANE drain to LLQ to bulb suction. Abdominal incisions clean/dry/intact. Patient experienced diarrhea overnight which has improved. Reports mild pain, but denies need for pain medication. Call light in reach. Frequent rounding performed.     Problem: Patient Centered Care  Goal: Patient preferences are identified and integrated in the patient's plan of care  Description: Interventions:  - What would you like us to know as we care for you? I love to read, I do not watch TV  - Provide timely, complete, and accurate information to patient/family  - Incorporate patient and family knowledge, values, beliefs, and cultural backgrounds into the planning and delivery of care  - Encourage patient/family to participate in care and decision-making at the level they choose  - Honor patient and family perspectives and choices  Outcome: Not Progressing     Problem: Patient/Family Goals  Goal: Patient/Family Long Term Goal  Description: Patient's Long Term Goal: Go home    Interventions:  -   - See additional Care Plan goals for specific interventions  Outcome: Not Progressing  Goal: Patient/Family Short Term Goal  Description: Patient's Short Term Goal: Pain control    Interventions:   -   - See additional Care Plan goals for specific interventions  Outcome: Not Progressing     Problem: PAIN - ADULT  Goal: Verbalizes/displays adequate comfort level or patient's stated pain goal  Description: INTERVENTIONS:  - Encourage pt to monitor pain and request assistance  - Assess pain using appropriate pain scale  - Administer analgesics based on type and severity of pain and evaluate response  - Implement non-pharmacological measures as appropriate and evaluate response  - Consider cultural and social influences on pain and pain management  - Manage/alleviate anxiety  - Utilize distraction and/or relaxation  techniques  - Monitor for opioid side effects  - Notify MD/LIP if interventions unsuccessful or patient reports new pain  - Anticipate increased pain with activity and pre-medicate as appropriate  Outcome: Not Progressing     Problem: RISK FOR INFECTION - ADULT  Goal: Absence of fever/infection during anticipated neutropenic period  Description: INTERVENTIONS  - Monitor WBC  - Administer growth factors as ordered  - Implement neutropenic guidelines  Outcome: Not Progressing     Problem: SAFETY ADULT - FALL  Goal: Free from fall injury  Description: INTERVENTIONS:  - Assess pt frequently for physical needs  - Identify cognitive and physical deficits and behaviors that affect risk of falls.  - [place] fall precautions as indicated by assessment.  - Educate pt/family on patient safety including physical limitations  - Instruct pt to call for assistance with activity based on assessment  - Modify environment to reduce risk of injury  - Provide assistive devices as appropriate  - Consider OT/PT consult to assist with strengthening/mobility  - Encourage toileting schedule  Outcome: Not Progressing     Problem: DISCHARGE PLANNING  Goal: Discharge to home or other facility with appropriate resources  Description: INTERVENTIONS:  - Identify barriers to discharge w/pt and caregiver  - Include patient/family/discharge partner in discharge planning  - Arrange for needed discharge resources and transportation as appropriate  - Identify discharge learning needs (meds, wound care, etc)  - Arrange for interpreters to assist at discharge as needed  - Consider post-discharge preferences of patient/family/discharge partner  - Complete POLST form as appropriate  - Assess patient's ability to be responsible for managing their own health  - Refer to Case Management Department for coordinating discharge planning if the patient needs post-hospital services based on physician/LIP order or complex needs related to functional status,  cognitive ability or social support system  Outcome: Not Progressing     Problem: GASTROINTESTINAL - ADULT  Goal: Minimal or absence of nausea and vomiting  Description: INTERVENTIONS:  - Maintain adequate hydration with IV or PO as ordered and tolerated  - Nasogastric tube to low intermittent suction as ordered  - Evaluate effectiveness of ordered antiemetic medications  - Provide nonpharmacologic comfort measures as appropriate  - Advance diet as tolerated, if ordered  - Obtain nutritional consult as needed  - Evaluate fluid balance  Outcome: Not Progressing  Goal: Maintains or returns to baseline bowel function  Description: INTERVENTIONS:  - Assess bowel function  - Maintain adequate hydration with IV or PO as ordered and tolerated  - Evaluate effectiveness of GI medications  - Encourage mobilization and activity  - Obtain nutritional consult as needed  - Establish a toileting routine/schedule  - Consider collaborating with pharmacy to review patient's medication profile  Outcome: Not Progressing     Problem: SKIN/TISSUE INTEGRITY - ADULT  Goal: Incision(s), wounds(s) or drain site(s) healing without S/S of infection  Description: INTERVENTIONS:  - Assess and document risk factors for pressure ulcer development  - Assess and document skin integrity  - Assess and document dressing/incision, wound bed, drain sites and surrounding tissue  - Implement wound care per orders  - Initiate isolation precautions as appropriate  - Initiate Pressure Ulcer prevention bundle as indicated  Outcome: Not Progressing

## 2024-05-26 NOTE — PLAN OF CARE
No acute changes over night. Pt is alert and oriented on RA. Cherry in place. Pt will be discharged with the cherry. Multiple loose BM over night. C-Diff was negative. Pt denies pain. IVF discontinued per order pt is tolerating liquids and low fiber soft diet. SHANE drain managed as ordered. Pt up with standby assist and walker. Call light is within reach and safety measures are in place.     Problem: PAIN - ADULT  Goal: Verbalizes/displays adequate comfort level or patient's stated pain goal  Description: INTERVENTIONS:  - Encourage pt to monitor pain and request assistance  - Assess pain using appropriate pain scale  - Administer analgesics based on type and severity of pain and evaluate response  - Implement non-pharmacological measures as appropriate and evaluate response  - Consider cultural and social influences on pain and pain management  - Manage/alleviate anxiety  - Utilize distraction and/or relaxation techniques  - Monitor for opioid side effects  - Notify MD/LIP if interventions unsuccessful or patient reports new pain  - Anticipate increased pain with activity and pre-medicate as appropriate  Outcome: Progressing     Problem: RISK FOR INFECTION - ADULT  Goal: Absence of fever/infection during anticipated neutropenic period  Description: INTERVENTIONS  - Monitor WBC  - Administer growth factors as ordered  - Implement neutropenic guidelines  Outcome: Progressing     Problem: SAFETY ADULT - FALL  Goal: Free from fall injury  Description: INTERVENTIONS:  - Assess pt frequently for physical needs  - Identify cognitive and physical deficits and behaviors that affect risk of falls.  - [place] fall precautions as indicated by assessment.  - Educate pt/family on patient safety including physical limitations  - Instruct pt to call for assistance with activity based on assessment  - Modify environment to reduce risk of injury  - Provide assistive devices as appropriate  - Consider OT/PT consult to assist with  strengthening/mobility  - Encourage toileting schedule  Outcome: Progressing     Problem: DISCHARGE PLANNING  Goal: Discharge to home or other facility with appropriate resources  Description: INTERVENTIONS:  - Identify barriers to discharge w/pt and caregiver  - Include patient/family/discharge partner in discharge planning  - Arrange for needed discharge resources and transportation as appropriate  - Identify discharge learning needs (meds, wound care, etc)  - Arrange for interpreters to assist at discharge as needed  - Consider post-discharge preferences of patient/family/discharge partner  - Complete POLST form as appropriate  - Assess patient's ability to be responsible for managing their own health  - Refer to Case Management Department for coordinating discharge planning if the patient needs post-hospital services based on physician/LIP order or complex needs related to functional status, cognitive ability or social support system  Outcome: Progressing     Problem: GASTROINTESTINAL - ADULT  Goal: Minimal or absence of nausea and vomiting  Description: INTERVENTIONS:  - Maintain adequate hydration with IV or PO as ordered and tolerated  - Nasogastric tube to low intermittent suction as ordered  - Evaluate effectiveness of ordered antiemetic medications  - Provide nonpharmacologic comfort measures as appropriate  - Advance diet as tolerated, if ordered  - Obtain nutritional consult as needed  - Evaluate fluid balance  Outcome: Progressing  Goal: Maintains or returns to baseline bowel function  Description: INTERVENTIONS:  - Assess bowel function  - Maintain adequate hydration with IV or PO as ordered and tolerated  - Evaluate effectiveness of GI medications  - Encourage mobilization and activity  - Obtain nutritional consult as needed  - Establish a toileting routine/schedule  - Consider collaborating with pharmacy to review patient's medication profile  Outcome: Progressing     Problem: SKIN/TISSUE INTEGRITY -  ADULT  Goal: Incision(s), wounds(s) or drain site(s) healing without S/S of infection  Description: INTERVENTIONS:  - Assess and document risk factors for pressure ulcer development  - Assess and document skin integrity  - Assess and document dressing/incision, wound bed, drain sites and surrounding tissue  - Implement wound care per orders  - Initiate isolation precautions as appropriate  - Initiate Pressure Ulcer prevention bundle as indicated  Outcome: Progressing

## 2024-05-26 NOTE — CM/SW NOTE
05/26/24 1200   Discharge Needs   Anticipated D/C needs Home health care   Choice of Post-Acute Provider   Informed patient of right to choose their preferred provider Yes   List of appropriate post-acute services provided to patient/family with quality data Yes   Patient/family choice [facility]     CM f/u with pt at bedside for HH choice.   CM reviewed  pt choice list. Pt agrees to use [facility] HH on dc.   [facility] res via [facility] and notified that pt only wants RN services.     Plan: Home with [facility] when stable.     / to remain available for support and/or discharge planning.   Brooklynn Cronin RN, BSN  Nurse   [phone]

## 2024-05-27 LAB
BLOOD TYPE BARCODE: 5100
UNIT VOLUME: 350 ML

## 2024-05-27 PROCEDURE — 97535 SELF CARE MNGMENT TRAINING: CPT

## 2024-05-27 PROCEDURE — 97530 THERAPEUTIC ACTIVITIES: CPT

## 2024-05-27 PROCEDURE — 97116 GAIT TRAINING THERAPY: CPT

## 2024-05-27 NOTE — PROGRESS NOTES
[facility] Hospitalist Progress Note     CC: Hospital Follow up    PCP: Rodney Kim MD       Assessment/Plan:     Active Problems:    Diverticulitis    Ms. Maher is an 89 yo F with PMH of diverticulosis and colovaginal fistula who presented for lap sigmoid resection.    Diverticular disease  Colovaginal fistula  Colovesical fistula  S/p lap sigmoid resection  - PRN pain meds, Transition to PO when able  - monitor for acute blood loss anemia, pre-op Hg 9.1  - Bowel reg, antiemetics  - DVT Prophy- HSQ  - PT/OT/SW   - as per surgery   - cherry to remain in place on discharge    Diarrhea  - C. Diff negative  - stop PO magnesium     Hx peptic ulcers  - PPI at home, is on H2B here     Microcytic anemia  - Hg 9.1 on preop labs  - monitor     Arthritis  - no home meds, worse in hands, worried about being able to manipulate and care for cherry     FN:  - IVF: none  - Diet: ADAT     DVT Prophy: SCD, HSQ  Lines: PIV, cherry     Dispo: pending clinical course     Outpatient records or previous hospital records reviewed.      Further recommendations pending patient's clinical course.  [facility] hospitalist to continue to follow patient while in house     Patient and/or patient's family given opportunity to ask questions and note understanding and agreeing with therapeutic plan as outlined     Natalie Yin MD  [facility] Hospitalist  Answering Service number: [phone]     Subjective:     Had diarrhea overnight, a little better today. Still feels nauseated. Complains of some pain.     OBJECTIVE:    Blood pressure (!) 186/76, pulse 78, temperature 98.1 °F (36.7 °C), temperature source Oral, resp. rate 18, height 5' 4\" (1.626 m), weight 158 lb (71.7 kg), SpO2 96%.    Temp:  [98.1 °F (36.7 °C)-98.3 °F (36.8 °C)] 98.1 °F (36.7 °C)  Pulse:  [77-88] 78  Resp:  [18] 18  BP: (142-186)/(54-76) 186/76  SpO2:  [90 %-96 %] 96 %      Intake/Output:    Intake/Output Summary (Last 24 hours) at 5/27/2024 1256  Last data filed at 5/27/2024 1100  Gross  per 24 hour   Intake 530 ml   Output 1880 ml   Net -1350 ml       Last 3 Weights   05/23/24 0558 158 lb (71.7 kg)   05/18/24 1307 165 lb (74.8 kg)       Exam   GEN: elderly female in NAD   HEENT: EOMI  Pulm: CTAB, no crackles or wheezes  CV: RRR, no murmurs  ABD: Soft, mild TTP, mildly-distended, + BS  SKIN: warm, dry  EXT: no edema      Data Review:       Labs:     Recent Labs   Lab 05/21/24  1013 05/23/24  1128 05/24/24  0612 05/25/24  1033 05/26/24  0707   RBC 4.47  --  3.90 3.79* 3.90   HGB 9.1*   < > 9.2* 8.4* 8.9*   HCT 30.8*   < > 28.5* 27.9* 31.1*   MCV 68.9*  --  73.1* 73.6* 79.7*   MCH 20.4*  --  23.6* 22.2* 22.8*   MCHC 29.5*  --  32.3 30.1* 28.6*   RDW 20.5*  --  22.1* 23.2* 24.4*   NEPRELIM 5.76  --  6.78  --   --    WBC 9.3  --  9.8 11.4* 6.2   .0  --  188.0 203.0 105.0*    < > = values in this interval not displayed.         Recent Labs   Lab 05/21/24  1013 05/24/24  0612 05/25/24  1033   * 108* 137*   BUN 17 13 11   CREATSERUM 0.70 0.75 0.57   EGFRCR 84 77 88   CA 9.7 7.7* 8.2*    144 139   K 4.2 3.8 4.2  4.2    115* 110   CO2 25.0 22.0 22.0       No results for input(s): \"ALT\", \"AST\", \"ALB\", \"AMYLASE\", \"LIPASE\", \"LDH\" in the last 168 hours.    Invalid input(s): \"ALPHOS\", \"TBIL\", \"DBIL\", \"TPROT\"      Imaging:  No results found.      Meds:     INPATIENT MEDICATIONS    Scheduled Medications:      heparin, 5,000 Units, Q8H ANDRA  famotidine, 20 mg, Daily   Or  famotidine, 20 mg, Daily  magnesium oxide, 400 mg, Daily            Drips:  lactated ringers, Last Rate: Stopped (05/23/24 1108)        PRN Medications  polyethylene glycol (PEG 3350), 17 g, Daily PRN  sennosides, 17.2 mg, Nightly PRN  oxyCODONE, 2.5 mg, Q4H PRN   Or  oxyCODONE, 5 mg, Q4H PRN  HYDROmorphone, 0.2 mg, Q2H PRN   Or  HYDROmorphone, 0.4 mg, Q2H PRN  ondansetron, 4 mg, Q6H PRN  metoclopramide, 10 mg, Q8H PRN

## 2024-05-27 NOTE — PLAN OF CARE
No acute changes over night. Pt is alert and oriented on RA. Cherry in place. Pt will be discharged with the cherry. Pt denies pain. IVF discontinued per order pt is tolerating liquids and low fiber soft diet. SHANE drain managed as ordered. Pt up with standby assist and walker. Call light is within reach and safety measures are in place.      Problem: PAIN - ADULT  Goal: Verbalizes/displays adequate comfort level or patient's stated pain goal  Description: INTERVENTIONS:  - Encourage pt to monitor pain and request assistance  - Assess pain using appropriate pain scale  - Administer analgesics based on type and severity of pain and evaluate response  - Implement non-pharmacological measures as appropriate and evaluate response  - Consider cultural and social influences on pain and pain management  - Manage/alleviate anxiety  - Utilize distraction and/or relaxation techniques  - Monitor for opioid side effects  - Notify MD/LIP if interventions unsuccessful or patient reports new pain  - Anticipate increased pain with activity and pre-medicate as appropriate  Outcome: Progressing     Problem: RISK FOR INFECTION - ADULT  Goal: Absence of fever/infection during anticipated neutropenic period  Description: INTERVENTIONS  - Monitor WBC  - Administer growth factors as ordered  - Implement neutropenic guidelines  Outcome: Progressing     Problem: SAFETY ADULT - FALL  Goal: Free from fall injury  Description: INTERVENTIONS:  - Assess pt frequently for physical needs  - Identify cognitive and physical deficits and behaviors that affect risk of falls.  - [place] fall precautions as indicated by assessment.  - Educate pt/family on patient safety including physical limitations  - Instruct pt to call for assistance with activity based on assessment  - Modify environment to reduce risk of injury  - Provide assistive devices as appropriate  - Consider OT/PT consult to assist with strengthening/mobility  - Encourage toileting  schedule  Outcome: Progressing     Problem: DISCHARGE PLANNING  Goal: Discharge to home or other facility with appropriate resources  Description: INTERVENTIONS:  - Identify barriers to discharge w/pt and caregiver  - Include patient/family/discharge partner in discharge planning  - Arrange for needed discharge resources and transportation as appropriate  - Identify discharge learning needs (meds, wound care, etc)  - Arrange for interpreters to assist at discharge as needed  - Consider post-discharge preferences of patient/family/discharge partner  - Complete POLST form as appropriate  - Assess patient's ability to be responsible for managing their own health  - Refer to Case Management Department for coordinating discharge planning if the patient needs post-hospital services based on physician/LIP order or complex needs related to functional status, cognitive ability or social support system  Outcome: Progressing     Problem: GASTROINTESTINAL - ADULT  Goal: Minimal or absence of nausea and vomiting  Description: INTERVENTIONS:  - Maintain adequate hydration with IV or PO as ordered and tolerated  - Nasogastric tube to low intermittent suction as ordered  - Evaluate effectiveness of ordered antiemetic medications  - Provide nonpharmacologic comfort measures as appropriate  - Advance diet as tolerated, if ordered  - Obtain nutritional consult as needed  - Evaluate fluid balance  Outcome: Progressing  Goal: Maintains or returns to baseline bowel function  Description: INTERVENTIONS:  - Assess bowel function  - Maintain adequate hydration with IV or PO as ordered and tolerated  - Evaluate effectiveness of GI medications  - Encourage mobilization and activity  - Obtain nutritional consult as needed  - Establish a toileting routine/schedule  - Consider collaborating with pharmacy to review patient's medication profile  Outcome: Progressing     Problem: SKIN/TISSUE INTEGRITY - ADULT  Goal: Incision(s), wounds(s) or drain  site(s) healing without S/S of infection  Description: INTERVENTIONS:  - Assess and document risk factors for pressure ulcer development  - Assess and document skin integrity  - Assess and document dressing/incision, wound bed, drain sites and surrounding tissue  - Implement wound care per orders  - Initiate isolation precautions as appropriate  - Initiate Pressure Ulcer prevention bundle as indicated  Outcome: Progressing

## 2024-05-27 NOTE — PROGRESS NOTES
[facility]  part of [facility]    Progress Note    Marisol Maher Patient Status:  Inpatient    1935 MRN [id number]   Location [facility] 4W/SW/SE Attending Bernardo Gupta MD   Hosp Day # 4 PCP Rodney Kim MD        Subjective:   Marisol Maher is a(n) 88 year old female     POD#4 s/p laparoscopic sigmoid colectomy and takedown colovesical and colovaginal fistula    Adequate analgesia, no longer using Dilaudid  Tolerating low residue diet, she has had several loose BMs and is passing flatus. Nausea is intermittent.              Allergies/Medications:   Allergies:   Allergies   Allergen Reactions    Acetaminophen NAUSEA ONLY and SWELLING     Swelling in fingers and hands    Prednisone OTHER (SEE COMMENTS)     Did not feel well after taking this medication      [COMPLETED] potassium chloride (Klor-Con M20) tab 40 mEq  40 mEq Oral Once    lactated ringers infusion   Intravenous Continuous    [COMPLETED] heparin (Porcine) 5000 UNIT/ML injection 5,000 Units  5,000 Units Subcutaneous Once    [COMPLETED] ceFAZolin (Ancef) 2g in 10mL IV syringe premix  2 g Intravenous Once    [COMPLETED] metRONIDAZOLE in sodium chloride 0.79% (Flagyl) 5 mg/mL IVPB premix 500 mg  500 mg Intravenous Once    heparin (Porcine) 5000 UNIT/ML injection 5,000 Units  5,000 Units Subcutaneous Q8H ANDRA    polyethylene glycol (PEG 3350) (Miralax) 17 g oral packet 17 g  17 g Oral Daily PRN    sennosides (Senokot) tab 17.2 mg  17.2 mg Oral Nightly PRN    famotidine (Pepcid) tab 20 mg  20 mg Oral Daily    Or    famotidine (Pepcid) 20 mg/2mL injection 20 mg  20 mg Intravenous Daily    magnesium oxide (Mag-Ox) tab 400 mg  400 mg Oral Daily    oxyCODONE immediate release tab 2.5 mg  2.5 mg Oral Q4H PRN    Or    oxyCODONE immediate release tab 5 mg  5 mg Oral Q4H PRN    HYDROmorphone (Dilaudid) 1 MG/ML injection 0.2 mg  0.2 mg Intravenous Q2H PRN    Or    HYDROmorphone (Dilaudid) 1 MG/ML injection 0.4 mg  0.4 mg  Intravenous Q2H PRN    ondansetron (Zofran) 4 MG/2ML injection 4 mg  4 mg Intravenous Q6H PRN    metoclopramide (Reglan) 5 mg/mL injection 10 mg  10 mg Intravenous Q8H PRN    [COMPLETED] metRONIDAZOLE in sodium chloride 0.79% (Flagyl) 5 mg/mL IVPB premix 500 mg  500 mg Intravenous Q8H           Objective:   Vital Signs:  Blood pressure 153/63, pulse 87, temperature 98.1 °F (36.7 °C), temperature source Oral, resp. rate 18, height 5' 4\" (1.626 m), weight 158 lb (71.7 kg), SpO2 95%.     I/O last 3 completed shifts:  In: 350 [P.O.:340; I.V.:10]  Out: 1225 [Urine:1025; Drains:200]    General: No acute distress. Alert and oriented x 3.  HEENT: Moist mucous membranes. EOM-I. PERRL  Neck: No lymphadenopathy.  No JVD. No carotid bruits.  Respiratory: Clear to auscultation bilaterally.  No wheezes. No rhonchi.  Cardiovascular: S1, S2.  Regular rate and rhythm.  No murmurs. Equal pulses   Abdomen: Soft, appropriate incisional tenderness, nondistended.  Positive bowel sounds. No rebound tenderness  Incision(s): C/D/I x 6  Drain: Serous  Vasquez: urine clear    Neurologic: No focal neurological deficits.  Musculoskeletal: Full range of motion of all extremities.  No swelling noted.  Integument: No lesions. No erythema.  Psychiatric: Appropriate mood and affect.        Assessment and Plan:     Diverticulitis with colovaginal and colovesical fistula  POD#4 s/p laparoscopic sigmoid colectomy and repair colovaginal and colovesical fistula  Progressing well  Continue ERAS  Ambulate  Inc antoine  Diet as tolerated  Maintain Vasquez at discharge and until 2 weeks postop, will need OP cystogram  Maintain Ever drain until after Vasquez removed            Results:     Lab Results   Component Value Date    WBC 6.2 05/26/2024    HGB 8.9 (L) 05/26/2024    HCT 31.1 (L) 05/26/2024    .0 (L) 05/26/2024    CREATSERUM 0.57 05/25/2024    BUN 11 05/25/2024     05/25/2024    K 4.2 05/25/2024    K 4.2 05/25/2024     05/25/2024    CO2  22.0 05/25/2024     (H) 05/25/2024    CA 8.2 (L) 05/25/2024    MG 2.0 05/24/2024    PHOS 3.4 05/24/2024       No results found.               Bernardo Gupta MD  5/27/2024

## 2024-05-27 NOTE — PHYSICAL THERAPY NOTE
PHYSICAL THERAPY TREATMENT NOTE - INPATIENT     Room Number: 458/458-A       Presenting Problem: colovaginal fistula s/p L colon resection  Co-Morbidities : UTI's, peptic ulcer, legally blind    Problem List  Active Problems:    Diverticulitis      PHYSICAL THERAPY ASSESSMENT   Patient demonstrates good  progress this session, goals  remain in progress.    Patient continues to function below baseline with transfers, gait, and stair negotiation.  Contributing factors to remaining limitations include decreased functional strength, decreased endurance/aerobic capacity, decreased muscular endurance, medical status, and decreased compliance/participation.  Next session anticipate patient to progress transfers, gait, and stair negotiation.  Physical Therapy will continue to follow patient for duration of hospitalization.    Patient continues to benefit from continued skilled PT services: at discharge to promote functional independence and safety with additional support and return home with home health PT.    PLAN  PT Treatment Plan: Coordination;Endurance;Gait training;Balance training;Transfer training  Frequency (Obs): Daily    SUBJECTIVE  I am fine, I don't need you.    OBJECTIVE  Precautions: Abdominal protective strategies    WEIGHT BEARING RESTRICTION                PAIN ASSESSMENT   Rating: Unable to rate  Location: no complains  Management Techniques: Activity promotion;Body mechanics    BALANCE  Static Sitting: Good  Dynamic Sitting: Fair +  Static Standing: Fair  Dynamic Standing: Fair    ACTIVITY TOLERANCE  Pulse: 87  Heart Rate Source: Monitor  Resp: 18  BP: 153/63  BP Location: Right arm  BP Method: Automatic  Patient Position: Lying     O2 WALK  Oxygen Therapy  SPO2% on Room Air at Rest: 95    AM-PAC '6-Clicks' INPATIENT SHORT FORM - BASIC MOBILITY  How much difficulty does the patient currently have...  Patient Difficulty: Turning over in bed (including adjusting bedclothes, sheets and blankets)?: None    Patient Difficulty: Sitting down on and standing up from a chair with arms (e.g., wheelchair, bedside commode, etc.): None   Patient Difficulty: Moving from lying on back to sitting on the side of the bed?: A Little   How much help from another person does the patient currently need...   Help from Another: Moving to and from a bed to a chair (including a wheelchair)?: A Little   Help from Another: Need to walk in hospital room?: A Little   Help from Another: Climbing 3-5 steps with a railing?: A Little     AM-PAC Score:  Raw Score: 20   Approx Degree of Impairment: 35.83%   Standardized Score (AM-PAC Scale): 47.67   CMS Modifier (G-Code): CJ    FUNCTIONAL ABILITY STATUS  Functional Mobility/Gait Assessment  Gait Assistance: Supervision  Distance (ft): 100  Assistive Device: Rolling walker  Pattern: Shuffle  Rolling: contact guard assist  Supine to Sit: contact guard assist  Sit to Supine:  NT  Sit to Stand: stand-by assist and contact guard assist    Additional information: patient in bed with daughter and RN during education on empty drains. Patient decline any activity d/t arthritis. Despite encouragement given, return after 30 min patient agree to amb with RW. She easily fatigue, terminated walk and refused to stairs training. Patient wants to go to bathroom and OT step in for assist. Educated daughter on proper log rolling with bed mobility , proper hand placement transfers with RW,  and gait pattern with correct posture. Instruction given on safety with stairs navigation at home. She apologized for patient behavior during session.     The patient's Approx Degree of Impairment: 35.83% has been calculated based on documentation in the [facility] '6 clicks' Inpatient Daily Activity Short Form.  Research supports that patients with this level of impairment may benefit from HH PT.  Final disposition will be made by interdisciplinary medical team.    THERAPEUTIC EXERCISES  Lower Extremity Ankle pumps  Heel slides      Position Supine       Patient End of Session: In bathroom - nursing staff aware;All patient questions and concerns addressed;RN aware of session/findings;Call light within reach;Needs met;With  staff;Family present    CURRENT GOALS   Goals to be met by: 6/5/24  Patient Goal Patient's self-stated goal is: to go home   Goal #1 Patient is able to demonstrate supine - sit EOB @ level: modified independent      Goal #1   Current Status  CGA to roll and supine<>sit   Goal #2 Patient is able to demonstrate transfers Sit to/from Stand at assistance level: modified independent with none      Goal #2  Current Status  CGA with RW   Goal #3 Patient is able to ambulate 450 feet with assist device: walker - rolling at assistance level: supervision   Goal #3   Current Status  CG/SBA with RW 100ft   Goal #4 Patient will negotiate 8 stairs/one curb w/ assistive device and supervision   Goal #4   Current Status  NT   Goal #5 Patient to demonstrate independence with home activity/exercise instructions provided to patient in preparation for discharge.   Goal #5   Current Status  ongoing     Gait Training: 10 minutes  Therapeutic Activity: 20 minutes  Neuromuscular Re-education:  minutes  Therapeutic Exercise:  minutes  Canalith Repositioning:  minutes  Manual Therapy:  minutes  Can add/delete any of these

## 2024-05-27 NOTE — OCCUPATIONAL THERAPY NOTE
OCCUPATIONAL THERAPY TREATMENT NOTE - INPATIENT        Room Number: 458/458-A     Presenting Problem: s/p Cystoscopy, dual ureteral bilateral lighted stents (Shane), Proctoscopy, laparoscopic sigmoid resection    Problem List  Active Problems:    Diverticulitis      OCCUPATIONAL THERAPY ASSESSMENT   Patient demonstrates good  progress this session, goals progressing with 2/4 met this session.    Patient continues to function near baseline with adls and functional mobility.   Contributing factors to remaining limitations include drain and continued need for catheter.  Pt demonstrating good progress in therapy. Pt will have assist of family for adls as needed. Pt/family currently unable to identify potential areas of concern in anticipation of discharge. Continued OT services not indicated at this time.       PLAN  OT Treatment Plan:  (discharge from OT)  OT Device Recommendations: None    SUBJECTIVE  \"We're very independent at home\"    OBJECTIVE  Precautions: Abdominal protective strategies    WEIGHT BEARING RESTRICTION     PAIN ASSESSMENT  Rating: -- (pt offering no c/o pain)  Location: abdomen (pt describing gas pains; pt was medicated for pain pre activity by rn)  Management Techniques: Activity promotion; Body mechanics; Repositioning; Nurse notified    ACTIVITY TOLERANCE  good    O2 SATURATIONS  Activity on room air    ACTIVITIES OF DAILY LIVING ASSESSMENT  AM-PAC ‘6-Clicks’ Inpatient Daily Activity Short Form  How much help from another person does the patient currently need…  -   Putting on and taking off regular lower body clothing?: A Little  -   Bathing (including washing, rinsing, drying)?: A Little  -   Toileting, which includes using toilet, bedpan or urinal? : A Little  -   Putting on and taking off regular upper body clothing?: A Little  -   Taking care of personal grooming such as brushing teeth?: None  -   Eating meals?: None    AM-PAC Score:  Score: 20  Approx Degree of Impairment:  38.32%  Standardized Score (AM-PAC Scale): 42.03  CMS Modifier (G-Code): CJ    FUNCTIONAL ADL ASSESSMENT  Eating: independent  Grooming: independent  UB Dressing: independent  LB Dressing: min assist  Toileting: min assist    Skilled Therapy Provided: Pt seen following PT and  agreeable to participation in therapy. Gait belt used during dynamic activity. Pt received up ambulating in the meek w/ rw and close supervision.  Abdominal sparing and posture strategies reviewed. Le dressing practiced w/ cues to manage catheter w/ clothing management. Pt currently requires min a for le dressing and tolieting. Pt maintained static standing w/ rw and mod I to complete self care task at sink level. Log roll for bed mobility demonstrated and practiced. Suggestions made for home modifications to maximize independence and minimize discomfort w/ activities. Pt/daughter verbalizing understanding and receptive. Pt/dtr currently unable to identify OT areas of concern in anticipation of discharge. Pt will be staying on main level initially to avoid stairs. Family will be available to assist as needed      At end of session pt left in bed w/ all needs in reach;daughter at bedside. No further OT contact planned       EDUCATION PROVIDED  Patient: Role of Occupational Therapy; Plan of Care; Surgical Precautions; Posture/Positioning; Energy Conservation; Compensatory ADL Techniques; Abdominal Protective Strategies  Patient's Response to Education: Verbalized Understanding; Returned Demonstration  Family/Caregiver's Response to Education: Verbalized Understanding    The patient's Approx Degree of Impairment: 38.32% has been calculated based on documentation in the [facility] '6 clicks' Inpatient Daily Activity Short Form.  Research supports that patients with this level of impairment may benefit from return to home.  Final disposition will be made by interdisciplinary medical team.    Patient End of Session: In bed;Needs met;Call light within  reach;RN aware of session/findings;All patient questions and concerns addressed;Family present    OT Goals:   Patient will complete functional transfer with Mod I  Comment: Goal Met    Patient will complete toileting with Mod I  Comment: pt required min a    Patient will tolerate standing for 4 minutes in prep for adls with Mod I   Comment:Goal Met    Patient will complete item retrieval with Mod I  Comment:na          Goals  on: 24  Frequency: 3-5x/w    Self-Care Home Management: 10 minutes  Therapeutic Activity: 13 minutes

## 2024-05-27 NOTE — PLAN OF CARE
A/O x 4. Tolerating low fiber diet. Cherry in place. Diarrhea improving. Reporting mild pain but declining pain medications. Reporting mild nausea but declining antiemetic. Cherry in place with good UOP. Abdominal lap sites clean/dry/intact. SHANE drain with serosanguinous output. Pts daughter at bedside and educated on cherry and drain care in preparation for discharge, verbalized understanding and able to return demo. Bed in lowest position, call light in reach, frequent rounding, nonskid footwear, fall precautions in place.

## 2024-05-28 VITALS
HEART RATE: 75 BPM | OXYGEN SATURATION: 96 % | HEIGHT: 64 IN | TEMPERATURE: 98 F | BODY MASS INDEX: 26.98 KG/M2 | SYSTOLIC BLOOD PRESSURE: 155 MMHG | DIASTOLIC BLOOD PRESSURE: 61 MMHG | WEIGHT: 158 LBS | RESPIRATION RATE: 18 BRPM

## 2024-05-28 RX ORDER — DOCUSATE SODIUM 100 MG/1
100 CAPSULE, LIQUID FILLED ORAL 2 TIMES DAILY
Qty: 14 CAPSULE | Refills: 0 | Status: SHIPPED | OUTPATIENT
Start: 2024-05-28 | End: 2024-06-04

## 2024-05-28 RX ORDER — CELECOXIB 200 MG/1
200 CAPSULE ORAL 2 TIMES DAILY PRN
Status: DISCONTINUED | OUTPATIENT
Start: 2024-05-28 | End: 2024-05-28

## 2024-05-28 RX ORDER — TRAMADOL HYDROCHLORIDE 50 MG/1
50 TABLET ORAL EVERY 6 HOURS PRN
Status: DISCONTINUED | OUTPATIENT
Start: 2024-05-28 | End: 2024-05-28

## 2024-05-28 RX ORDER — TRAMADOL HYDROCHLORIDE 50 MG/1
50 TABLET ORAL EVERY 6 HOURS PRN
Qty: 10 TABLET | Refills: 0 | Status: SHIPPED | OUTPATIENT
Start: 2024-05-28

## 2024-05-28 RX ORDER — CELECOXIB 200 MG/1
200 CAPSULE ORAL 2 TIMES DAILY
Qty: 20 CAPSULE | Refills: 0 | Status: SHIPPED | OUTPATIENT
Start: 2024-05-28 | End: 2024-06-07

## 2024-05-28 NOTE — PHYSICAL THERAPY NOTE
Followed up this AM for PT treatment, patient politely declining all OOB mobility and stairs this AM stating she wants to let her breakfast settle. Patient declines mobility concerns or need for additional therapy intervention prior to anticipated discharge this date. Will follow up if pt medical or admission status changes warranting further treatment, thank you.     Susan Ho, PT

## 2024-05-28 NOTE — CDS QUERY
Clinical Significance - Lab Results Associated with Blood Loss  CLINICAL DOCUMENTATION CLARIFICATION FORM  Dear Doctor Humphrey:  Clinical information (provided below) indicates blood loss and abnormal blood counts.   PLEASE (X) ALL DIAGNOSES THAT APPLY.  SELECTION BY PROVIDER ONLY  (  )  Acute Blood Loss Anemia  (  ) Post Op Blood loss Anemia  ( x )  Other (please specify): Acute blood loss anemia on Chronic Microcytic Anemia         Clinical Indicators:  5/24 H&P:  Microcytic Anemia  Hgb 9.1 on preop labs  Monitor for acute blood loss anemia  Laboratory results include:  Date: 5/22 Hgb 9.1/ Hct 30.8  Date: 5/23 Hgb 7.4/ Hct 24.5   Date: 5/23 Hgb 9.9/ Hct 31.6   5/23 Op report: estimated blood loss 250ml    Risk Factors:  5/23 Recent procedure: Cystoscopy/lap sigmoid colectomy, colorectal anastomosis  Diverticulitis with vesicointestinal fistula    Treatments:  Monitor daily labs- Hgb/ Hct  IV Fluids      If you have any questions, please contact Clinical : Radha Vang RN CDS  at [email or web address]    Thank You!    THIS FORM IS A PERMANENT PART OF THE MEDICAL RECORD

## 2024-05-28 NOTE — DISCHARGE INSTRUCTIONS
Call the nurses station at [place] if you have any questions : [phone]       Dr. Bernardo Gupta   [phone]    DISCHARGE INSTRUCTIONS-LAPAROSCOPIC/BOWEL RESECTION    Activity can be resumed as tolerated including walking, stairs, light exercise and driving short distances.  Heavy lifting (i.e. objects > 15-20 lbs.) is to be avoided for 3-4 weeks.  Normal time off work is one to two weeks.    Incisional  pains are commonly of moderate intensity in the first 24-48 hours and gradually improve over the initial post-operative week.  A prescription for Celebrex (celecoxib) will be sent to your pharmacy. Please take until the prescription is complete. Prescription pain medication (Tramadol) should be used initially according to the pain experienced and gradually weaned over the next few days.  Prescription pain medication can make you nauseated, light-headed and constipated: it should be taken with food and discontinued if side-effects develop.  A prescription for  stool softener  (Colace) is helpful to avoid constipation. Take 1 orally twice a day.   If  no bowel movement within 48 hours, MOM 30 mls may be helpful.   You should avoid advil, motrin, ibuprofen, or aleve as it may cause problems with your incisions with delayed healing.      Your diet should consist low residual diet for 6 weeks then transition to a high-fiber diet.       You may shower in 24 hours, no bath or swimming for 4 weeks from your surgery date.  Apply an ice bag over your dressing for 72 hours.  No driving for 5 days or until off pain medication.       Activity after surgery  After surgery, take it easy for the rest of the day. If you had general anesthesia, don’t use machinery or power tools, drink alcohol, or make any major decisions for at least the first 24 hours.  Don’t drive while you are still taking opioid pain medication, and don’t drive u.ntil you are able to step firmly on the brake pedal without hesitation.  Ask others to help with  chores and errands while you recover.  Don’t lift anything heavier than 10 pounds until your doctor says it’s OK.  Don’t mow the lawn, shovel snow, use a vacuum , or do other strenuous activities until your doctor says it’s okay.  Walk as often as you feel able.  Continue the coughing and deep breathing exercises that you learned in the hospital.  Ask your doctor when you can expect to return to work.  Avoid constipation:  Drink 6-8 glasses of water a day, unless otherwise instructed.  Use a laxative or a mild stool softener as instructed by your doctor.  Call your doctor, or the 24-hour answering service, immediately if you have any of the following:  Yellowing of your eyes or skin (jaundice)  Chills  Fever above 101.5°F or 38.5°C    Redness, swelling, increasing pain, pus, or a foul smell at the incision site  Dark or rust-colored urine  Stool that is natalya-colored or light in color instead of brown  Increasing abdominal pain  persistent nausea or bowel problems, uncontrolled pain or poor appetite     Contact the office tomorrow to make a follow appointment for 10-14 days after surgery.      Bernardo Gupta MD. [facility]  GENERAL SURGERY  [facility]  OFFICE [phone]    5/28/2024  9:19 AM

## 2024-05-28 NOTE — PLAN OF CARE
Marisol is AO x 4. Room air. Voiding per cherry which will remain. SHANE in place will also remain in. Mild pain that increases with activity but she declines intervention. SBP has been elevated this morning due to patient being very anxious in regards to discharging home with the drains and doing self care. On-call doctor was notified of patient status and we're monitoring BP. Fall precautions are in place. Discharge with [facility] pending medical clearance.

## 2024-05-29 LAB — RGTSCRN: 4

## 2024-05-29 NOTE — DISCHARGE SUMMARY
General Medicine Discharge Summary     Patient ID:  Marisol Maher  88 year old  9/30/1935    Admit date: 5/23/2024    Discharge date and time: 05/28/24    Attending Physician: No att. providers found     Primary Care Physician: Rodney Kim MD     Reason for admission: diverticular disease     Discharge Diagnoses: Divericular disease, colovaginal fistula  Diverticulitis    Discharged Condition: stable    Disposition: home    Consults:   Consultants         Provider   Role Specialty     Natalie Yin MD      Consulting Physician HOSPITALIST     Ranjit Nino MD      Consulting Physician HOSPITALIST              HPI: Ms. Maher is an 89 yo F with PMH of diverticulosis and colovaginal fistula who presented for lap sigmoid resection. Patient seen on POD#1, feels tired today, abdominal soreness. Feels bloated, no CP, SOB, N/V. No flatus yet. Lives at home with  who is in a wheelchair. Does have arthritis and worries about being able to care for her cherry.     Hospital Course:     Ms. Maher is an 89 yo F with PMH of diverticulosis and colovaginal fistula who presented for lap sigmoid resection. Post op course without complication. Stable for discharge home with cherry and SHANE drain. F/u PCP and surgery.     Diverticular disease  Colovaginal fistula  Colovesical fistula  S/p lap sigmoid resection  - PRN pain meds, Transition to PO when able  - monitor for acute blood loss anemia, pre-op Hg 9.1  - Bowel reg, antiemetics  - DVT Prophy- HSQ  - PT/OT/SW   - as per surgery   - cherry to remain in place on discharge     Diarrhea  - C. Diff negative  - stop PO magnesium     Hx peptic ulcers  - PPI at home, is on H2B here     Microcytic anemia  - Hg 9.1 on preop labs  - monitor     Arthritis  - no home meds, worse in hands, worried about being able to manipulate and care for cherry     Exam   GEN: elderly female in NAD   HEENT: EOMI  Pulm: CTAB, no crackles or wheezes  CV: RRR, no murmurs  ABD: Soft, mild TTP,  mildly-distended, + BS  SKIN: warm, dry  EXT: no edema    Operative Procedures: Procedure(s) (LRB):  Cystoscopy, dual ureteral bilateral lighted stents (Shane), Proctoscopy, laparoscopic sigmoid resection, mobilization of splenic flexure, cystorrhaphy (Left)  LIGHTED URETERAL STENT INSERTION (Bilateral)     Imaging: No results found.        Home Medication Changes:     I reconciled current and discharge medications on day of discharge. These medication changes have been made as below         Medication List        START taking these medications      celecoxib 200 MG Caps  Commonly known as: CeleBREX  Take 1 capsule (200 mg total) by mouth 2 (two) times daily for 10 days.     docusate sodium 100 MG Caps  Commonly known as: Colace  Take 1 capsule (100 mg total) by mouth 2 (two) times daily for 7 days.  Notes to patient: This is a stool softener. If you have frequent or loose stools skip a dose as needed     traMADol 50 MG Tabs  Commonly known as: Ultram  Take 1 tablet (50 mg total) by mouth every 6 (six) hours as needed for Pain.  Notes to patient: No driving or drinking alcohol while taking this            CONTINUE taking these medications      pantoprazole 40 MG Tbec  Commonly known as: Protonix               Where to Get Your Medications        These medications were sent to [id number] - [place], [place] - [address] AT [facility], [phone], [phone] [address] , [facility] [address]      Hours: 24-hours Phone: [phone]   celecoxib 200 MG Caps  docusate sodium 100 MG Caps  traMADol 50 MG Tabs         Activity:  as instructed  Diet: regular diet  Wound Care: keep wound clean and dry  Code Status: Full Code  O2: n/a    Follow-up with:    PCP   Specialist surgery       FU   Follow-up Information       Rodney Kim MD Follow up.    Specialty: Family Practice  Contact information:  [address]  [facility] [address] [phone]                Elton Martinez PA Follow up on 6/7/2024.    Specialty: Physician Assistant  Why: for surgical follow up for Dr Gupta, 6/7 at 9am  Contact information:  [address]  [address]  [facility] [address]  [phone]                             PR instructions:      Other Discharge Instructions:         Call the nurses station at [place] if you have any questions : [phone]       Dr. Bernardo Gupta   [phone]    DISCHARGE INSTRUCTIONS-LAPAROSCOPIC/BOWEL RESECTION    Activity can be resumed as tolerated including walking, stairs, light exercise and driving short distances.  Heavy lifting (i.e. objects > 15-20 lbs.) is to be avoided for 3-4 weeks.  Normal time off work is one to two weeks.    Incisional  pains are commonly of moderate intensity in the first 24-48 hours and gradually improve over the initial post-operative week.  A prescription for Celebrex (celecoxib) will be sent to your pharmacy. Please take until the prescription is complete. Prescription pain medication (Tramadol) should be used initially according to the pain experienced and gradually weaned over the next few days.  Prescription pain medication can make you nauseated, light-headed and constipated: it should be taken with food and discontinued if side-effects develop.  A prescription for  stool softener  (Colace) is helpful to avoid constipation. Take 1 orally twice a day.   If  no bowel movement within 48 hours, MOM 30 mls may be helpful.   You should avoid advil, motrin, ibuprofen, or aleve as it may cause problems with your incisions with delayed healing.      Your diet should consist low residual diet for 6 weeks then transition to a high-fiber diet.       You may shower in 24 hours, no bath or swimming for 4 weeks from your surgery date.  Apply an ice bag over your dressing for 72 hours.  No driving for 5 days or until off pain medication.       Activity after surgery  After surgery, take it easy for the rest of the day. If you had  general anesthesia, don’t use machinery or power tools, drink alcohol, or make any major decisions for at least the first 24 hours.  Don’t drive while you are still taking opioid pain medication, and don’t drive u.ntil you are able to step firmly on the brake pedal without hesitation.  Ask others to help with chores and errands while you recover.  Don’t lift anything heavier than 10 pounds until your doctor says it’s OK.  Don’t mow the lawn, shovel snow, use a vacuum , or do other strenuous activities until your doctor says it’s okay.  Walk as often as you feel able.  Continue the coughing and deep breathing exercises that you learned in the hospital.  Ask your doctor when you can expect to return to work.  Avoid constipation:  Drink 6-8 glasses of water a day, unless otherwise instructed.  Use a laxative or a mild stool softener as instructed by your doctor.  Call your doctor, or the 24-hour answering service, immediately if you have any of the following:  Yellowing of your eyes or skin (jaundice)  Chills  Fever above 101.5°F or 38.5°C    Redness, swelling, increasing pain, pus, or a foul smell at the incision site  Dark or rust-colored urine  Stool that is natalya-colored or light in color instead of brown  Increasing abdominal pain  persistent nausea or bowel problems, uncontrolled pain or poor appetite     Contact the office tomorrow to make a follow appointment for 10-14 days after surgery.      Bernardo Gupta MD. [facility]  GENERAL SURGERY  [facility]  OFFICE [phone]    5/28/2024  9:19 AM            Follow-up with labs: none    Total Time Coordinating Care: 31 minutes    Patient had opportunity to ask questions and state understand and agree with therapeutic plan as outlined      Natalie Yin MD  [facility] Hospitalist

## 2024-05-30 NOTE — PAYOR COMM NOTE
--------------  DISCHARGE REVIEW    Payor: [employer]  Subscriber #:  [id number]  Authorization Number: [id number]    Admit date: 5/23/24  Admit time:   5:50 AM  Discharge Date: 5/28/2024  5:56 PM       General Medicine Discharge Summary     Patient ID:  Marisol Maher  88 year old  9/30/1935    Admit date: 5/23/2024    Discharge date and time: 05/28/24    Attending Physician: No att. providers found     Primary Care Physician: Rodney Kim MD     Reason for admission: diverticular disease     Discharge Diagnoses: Divericular disease, colovaginal fistula  Diverticulitis    Discharged Condition: stable    Disposition: home    Consults:   Consultants         Provider   Role Specialty     Natalie Yin MD      Consulting Physician HOSPITALIST     Ranjit Nino MD      Consulting Physician HOSPITALIST              HPI: Ms. Maher is an 89 yo F with PMH of diverticulosis and colovaginal fistula who presented for lap sigmoid resection. Patient seen on POD#1, feels tired today, abdominal soreness. Feels bloated, no CP, SOB, N/V. No flatus yet. Lives at home with  who is in a wheelchair. Does have arthritis and worries about being able to care for her cherry.     Hospital Course:     Ms. Maher is an 89 yo F with PMH of diverticulosis and colovaginal fistula who presented for lap sigmoid resection. Post op course without complication. Stable for discharge home with cherry and SHANE drain. F/u PCP and surgery.     Diverticular disease  Colovaginal fistula  Colovesical fistula  S/p lap sigmoid resection  - PRN pain meds, Transition to PO when able  - monitor for acute blood loss anemia, pre-op Hg 9.1  - Bowel reg, antiemetics  - DVT Prophy- HSQ  - PT/OT/SW   - as per surgery   - cherry to remain in place on discharge     Diarrhea  - C. Diff negative  - stop PO magnesium     Hx peptic ulcers  - PPI at home, is on H2B here     Microcytic anemia  - Hg 9.1 on preop labs  - monitor     Arthritis  - no home  meds, worse in hands, worried about being able to manipulate and care for cherry     Exam   GEN: elderly female in NAD   HEENT: EOMI  Pulm: CTAB, no crackles or wheezes  CV: RRR, no murmurs  ABD: Soft, mild TTP, mildly-distended, + BS  SKIN: warm, dry  EXT: no edema    Operative Procedures: Procedure(s) (LRB):  Cystoscopy, dual ureteral bilateral lighted stents (Shane), Proctoscopy, laparoscopic sigmoid resection, mobilization of splenic flexure, cystorrhaphy (Left)  LIGHTED URETERAL STENT INSERTION (Bilateral)     Imaging: No results found.        Home Medication Changes:     I reconciled current and discharge medications on day of discharge. These medication changes have been made as below         Medication List        START taking these medications      celecoxib 200 MG Caps  Commonly known as: CeleBREX  Take 1 capsule (200 mg total) by mouth 2 (two) times daily for 10 days.     docusate sodium 100 MG Caps  Commonly known as: Colace  Take 1 capsule (100 mg total) by mouth 2 (two) times daily for 7 days.  Notes to patient: This is a stool softener. If you have frequent or loose stools skip a dose as needed     traMADol 50 MG Tabs  Commonly known as: Ultram  Take 1 tablet (50 mg total) by mouth every 6 (six) hours as needed for Pain.  Notes to patient: No driving or drinking alcohol while taking this            CONTINUE taking these medications      pantoprazole 40 MG Tbec  Commonly known as: Protonix               Where to Get Your Medications        These medications were sent to [id number] - [place], [place] - [address] AT [facility], [phone], [phone] [address] , [facility] [address]      Hours: 24-hours Phone: [phone]   celecoxib 200 MG Caps  docusate sodium 100 MG Caps  traMADol 50 MG Tabs         Activity:  as instructed  Diet: regular diet  Wound Care: keep wound clean and dry  Code Status: Full Code  O2: n/a    Follow-up with:    PCP    Specialist surgery       FU   Follow-up Information       Rodney Kim MD Follow up.    Specialty: Family Practice  Contact information:  [place] [address] [phone]               Elton Martinez PA Follow up on 6/7/2024.    Specialty: Physician Assistant  Why: for surgical follow up for Dr Gupta, 6/7 at 9am  Contact information:  [address]  [address]  [facility] [id number] [phone]                             AL instructions:      Other Discharge Instructions:         Call the nurses station at [place] if you have any questions : [phone]       Dr. Bernardo Gupta   [phone]    DISCHARGE INSTRUCTIONS-LAPAROSCOPIC/BOWEL RESECTION    Activity can be resumed as tolerated including walking, stairs, light exercise and driving short distances.  Heavy lifting (i.e. objects > 15-20 lbs.) is to be avoided for 3-4 weeks.  Normal time off work is one to two weeks.    Incisional  pains are commonly of moderate intensity in the first 24-48 hours and gradually improve over the initial post-operative week.  A prescription for Celebrex (celecoxib) will be sent to your pharmacy. Please take until the prescription is complete. Prescription pain medication (Tramadol) should be used initially according to the pain experienced and gradually weaned over the next few days.  Prescription pain medication can make you nauseated, light-headed and constipated: it should be taken with food and discontinued if side-effects develop.  A prescription for  stool softener  (Colace) is helpful to avoid constipation. Take 1 orally twice a day.   If  no bowel movement within 48 hours, MOM 30 mls may be helpful.   You should avoid advil, motrin, ibuprofen, or aleve as it may cause problems with your incisions with delayed healing.      Your diet should consist low residual diet for 6 weeks then transition to a high-fiber diet.       You may shower in 24 hours, no bath or swimming for 4 weeks from your  surgery date.  Apply an ice bag over your dressing for 72 hours.  No driving for 5 days or until off pain medication.       Activity after surgery  After surgery, take it easy for the rest of the day. If you had general anesthesia, don’t use machinery or power tools, drink alcohol, or make any major decisions for at least the first 24 hours.  Don’t drive while you are still taking opioid pain medication, and don’t drive u.ntil you are able to step firmly on the brake pedal without hesitation.  Ask others to help with chores and errands while you recover.  Don’t lift anything heavier than 10 pounds until your doctor says it’s OK.  Don’t mow the lawn, shovel snow, use a vacuum , or do other strenuous activities until your doctor says it’s okay.  Walk as often as you feel able.  Continue the coughing and deep breathing exercises that you learned in the hospital.  Ask your doctor when you can expect to return to work.  Avoid constipation:  Drink 6-8 glasses of water a day, unless otherwise instructed.  Use a laxative or a mild stool softener as instructed by your doctor.  Call your doctor, or the 24-hour answering service, immediately if you have any of the following:  Yellowing of your eyes or skin (jaundice)  Chills  Fever above 101.5°F or 38.5°C    Redness, swelling, increasing pain, pus, or a foul smell at the incision site  Dark or rust-colored urine  Stool that is natalya-colored or light in color instead of brown  Increasing abdominal pain  persistent nausea or bowel problems, uncontrolled pain or poor appetite     Contact the office tomorrow to make a follow appointment for 10-14 days after surgery.      Bernardo Gupta MD. [facility]  GENERAL SURGERY  [facility]  OFFICE [phone]    5/28/2024  9:19 AM            Follow-up with labs: none    Total Time Coordinating Care: 31 minutes    Patient had opportunity to ask questions and state understand and agree with therapeutic plan as outlined      Natalie  MD [place]  [facility] Hospitalist          Electronically signed by Natalie Yin MD on 5/29/2024  8:06 AM         REVIEWER COMMENTS

## (undated) DEVICE — SIGMOIDOSCOPE LIGHTED BIOSEAL

## (undated) DEVICE — TRAY CATH FOLEY 16FR INCLUDE BARDX IC COMPLT CARE

## (undated) DEVICE — JELLY,LUBE,STERILE,FLIP TOP,TUBE,2-OZ: Brand: MEDLINE

## (undated) DEVICE — LEGGINGS, PAIR, 31X48, STERILE: Brand: MEDLINE

## (undated) DEVICE — SUT PDS II 1 36IN ABSRB VLT L36MM CT-1

## (undated) DEVICE — CIRCULAR MECH XL SEAL 29MM

## (undated) DEVICE — ABSORBABLE WOUND CLOSURE DEVICE: Brand: V-LOC 90

## (undated) DEVICE — NITINOL WIRE WITH HYDROPHILIC TIP: Brand: SENSOR

## (undated) DEVICE — DISPOSABLE GRASPER CARTRIDGE: Brand: DIRECT DRIVE REPOSABLE GRASPERS

## (undated) DEVICE — E-Z CLEAN, PTFE COATED, ELECTROSURGICAL LAPAROSCOPIC ELECTRODE, L-HOOK, 33 CM., SINGLE-USE, FOR USE WITH HAND CONTROL PENCIL: Brand: MEGADYNE

## (undated) DEVICE — ARTICULATING RELOAD WITH TRI-STAPLE TECHNOLOGY: Brand: ENDO GIA

## (undated) DEVICE — POWDER HEMSTAT 3GM OXIDIZED REGENERATED CELOS

## (undated) DEVICE — MARYLAND JAW LAPAROSCOPIC SEALER/DIVIDER COATED: Brand: LIGASURE

## (undated) DEVICE — DISPOSABLE SUCTION/IRRIGATOR TUBE SET: Brand: AHTO

## (undated) DEVICE — VISUALIZATION SYSTEM: Brand: CLEARIFY

## (undated) DEVICE — INTENDED USE FOR SURGICAL MARKING ON INTACT SKIN, ALSO PROVIDES A PERMANENT METHOD OF IDENTIFYING OBJECTS IN THE OPERATING ROOM: Brand: WRITESITE® PLUS MINI PREP RESISTANT MARKER

## (undated) DEVICE — EVACUATOR SUR 100CC SIL BLB WND

## (undated) DEVICE — SYRINGE,TOOMEY,IRRIGATION,70CC,STERILE: Brand: MEDLINE

## (undated) DEVICE — DRAPE,UNDRBUT,WHT GRAD PCH,CAPPORT,20/CS: Brand: MEDLINE

## (undated) DEVICE — KIT,ANTI FOG,W/SPONGE & FLUID,SOFT PACK: Brand: MEDLINE

## (undated) DEVICE — [URETERAL KIT: 2 - URETERAL CATHETERS, 6 FR OUTER DIAMETER, 70 CM LENGTH,  2 - EMITTING FIBER, 0.75 OUTER DIAMETER, 370 CM LENGTH,  DO NOT USE IF PACKAGE IS DAMAGED]: Brand: IRIS

## (undated) DEVICE — SUT ETHLN 3-0 30IN FS-1 NABSRB BLK 24MM 3/8 C

## (undated) DEVICE — UNDYED BRAIDED (POLYGLACTIN 910), SYNTHETIC ABSORBABLE SUTURE: Brand: COATED VICRYL

## (undated) DEVICE — GUIDEWIRE URO L150CM DIA0.035IN TAPR 3CM NIT

## (undated) DEVICE — BLAKE SILICONE DRAIN, 15 FR ROUND, HUBLESS: Brand: BLAKE

## (undated) DEVICE — ADHESIVE SKIN TOP FOR WND CLSR DERMBND ADV

## (undated) DEVICE — SUT VCRL 0 L27IN ABSRB VLT L26MM UR-6 5/8-ZZDISC-USE 421016

## (undated) DEVICE — GAMMEX® PI HYBRID SIZE 7.5, STERILE POWDER-FREE SURGICAL GLOVE, POLYISOPRENE AND NEOPRENE BLEND: Brand: GAMMEX

## (undated) DEVICE — TROCAR: Brand: KII® SLEEVE

## (undated) DEVICE — DRAPE,LITHOTOMY,STERILE: Brand: MEDLINE

## (undated) DEVICE — CONNECTOR CATH FOR 4/5/6FR URET CATH

## (undated) DEVICE — MEDI-VAC NON-CONDUCTIVE SUCTION TUBING: Brand: CARDINAL HEALTH

## (undated) DEVICE — TROCARS: Brand: KII® BALLOON BLUNT TIP SYSTEM

## (undated) DEVICE — GOWN,SIRUS,FAB REINF,RAGLAN,XL,STERILE: Brand: MEDLINE

## (undated) DEVICE — INTENDED FOR TISSUE SEPARATION, AND OTHER PROCEDURES THAT REQUIRE A SHARP SURGICAL BLADE TO PUNCTURE OR CUT.: Brand: BARD-PARKER ® STAINLESS STEEL BLADES

## (undated) DEVICE — SUT COAT VCRL+ 0 27IN UR-6 ABSRB VLT ANTIBACT

## (undated) DEVICE — PROVIDES A STERILE INTERFACE BETWEEN THE OPERATING ROOM SURGICAL LAMPS (NON-STERILE) AND THE SURGEON OR NURSE (STERILE).: Brand: STERION®CLAMP COVER FABRIC

## (undated) DEVICE — TROCAR: Brand: KII FIOS FIRST ENTRY

## (undated) DEVICE — AIRSEAL 12 MM ACCESS PORT AND PALM GRIP OBTURATOR WITH BLADELESS OPTICAL TIP, 120 MM LENGTH: Brand: AIRSEAL

## (undated) DEVICE — DISPOSABLE GRASPER: Brand: EPIX LAPAROSCOPIC GRASPER

## (undated) DEVICE — SUT PERMA- 0 30IN NABSRB BLK TIE SILK

## (undated) DEVICE — SOLUTION IRRIG 1000ML 0.9% NACL USP BTL

## (undated) DEVICE — AIRSEAL TRI-LUMEN LILTERED TUBE SET: Brand: AIRSEAL

## (undated) DEVICE — A P RESECTION: Brand: MEDLINE INDUSTRIES, INC.

## (undated) DEVICE — TROCAR: Brand: KII SLEEVE

## (undated) DEVICE — APPLICATOR ENDOSCP FOR ADJUNCTIVE HEMSTAS

## (undated) DEVICE — GLOVE SUR 7.5 SENSICARE PI PIP CRM PWD F

## (undated) DEVICE — LAPAROSCOPIC ACCESS SYSTEM: Brand: ALEXIS LAPAROSCOPIC SYSTEM

## (undated) DEVICE — DRAPE,ABDOMINAL,MAJOR,STERILE: Brand: MEDLINE

## (undated) DEVICE — PAD POS 36IN DISP SURGYPAD

## (undated) DEVICE — YANKAUER,FLEXIBLE HANDLE,REGLR CAPACITY: Brand: MEDLINE INDUSTRIES, INC.

## (undated) DEVICE — ANTIBACTERIAL UNDYED BRAIDED (POLYGLACTIN 910), SYNTHETIC ABSORBABLE SUTURE: Brand: COATED VICRYL

## (undated) DEVICE — SOLUTION IRRIG 3000ML 0.9% NACL FLX CONT

## (undated) NOTE — LETTER
[facility]  [address], [place], [place]    Authorization for Surgical Operation and Procedure                               I hereby authorize Bernardo Gupta MD, my physician and his/her assistants (if applicable), which may include medical students, residents, and/or fellows, to perform the following surgical operation/ procedure and administer such anesthesia as may be determined necessary by my physician: Operation/Procedure name (s) Proctoscopy, laparoscopic sigmoid resection, possible open laparotomy on Marisol Maher   2.   I recognize that during the surgical operation/procedure, unforeseen conditions may necessitate additional or different procedures than those listed above.  I, therefore, further authorize and request that the above-named surgeon, assistants, or designees perform such procedures as are, in their judgment, necessary and desirable.    3.   My surgeon/physician has discussed prior to my surgery the potential benefits, risks and side effects of this procedure; the likelihood of achieving goals; and potential problems that might occur during recuperation.  They also discussed reasonable alternatives to the procedure, including risks, benefits, and side effects related to the alternatives and risks related to not receiving this procedure.  I have had all my questions answered and I acknowledge that no guarantee has been made as to the result that may be obtained.    4.   Should the need arise during my operation/procedure, which includes change of level of care prior to discharge, I also consent to the administration of blood and/or blood products.  Further, I understand that despite careful testing and screening of blood or blood products by collecting agencies, I may still be subject to ill effects as a result of receiving a blood transfusion and/or blood products.  The following are some, but not all, of the potential risks that can occur: fever and allergic reactions,  hemolytic reactions, transmission of diseases such as Hepatitis, AIDS and Cytomegalovirus (CMV) and fluid overload.  In the event that I wish to have an autologous transfusion of my own blood, or a directed donor transfusion, I will discuss this with my physician.  Check only if Refusing Blood or Blood Products  I understand refusal of blood or blood products as deemed necessary by my physician may have serious consequences to my condition to include possible death. I hereby assume responsibility for my refusal and release the hospital, its personnel, and my physicians from any responsibility for the consequences of my refusal.    o  Refuse   5.   I authorize the use of any specimen, organs, tissues, body parts or foreign objects that may be removed from my body during the operation/procedure for diagnosis, research or teaching purposes and their subsequent disposal by hospital authorities.  I also authorize the release of specimen test results and/or written reports to my treating physician on the hospital medical staff or other referring or consulting physicians involved in my care, at the discretion of the Pathologist or my treating physician.    6.   I consent to the photographing or videotaping of the operations or procedures to be performed, including appropriate portions of my body for medical, scientific, or educational purposes, provided my identity is not revealed by the pictures or by descriptive texts accompanying them.  If the procedure has been photographed/videotaped, the surgeon will obtain the original picture, image, videotape or CD.  The hospital will not be responsible for storage, release or maintenance of the picture, image, tape or CD.    7.   I consent to the presence of a  or observers in the operating room as deemed necessary by my physician or their designees.    8.   I recognize that in the event my procedure results in extended X-Ray/fluoroscopy time, I may develop a  skin reaction.    9. If I have a Do Not Attempt Resuscitation (DNAR) order in place, that status will be suspended while in the operating room, procedural suite, and during the recovery period unless otherwise explicitly stated by me (or a person authorized to consent on my behalf). The surgeon or my attending physician will determine when the applicable recovery period ends for purposes of reinstating the DNAR order.  10. Patients having a sterilization procedure: I understand that if the procedure is successful the results will be permanent and it will therefore be impossible for me to inseminate, conceive, or bear children.  I also understand that the procedure is intended to result in sterility, although the result has not been guaranteed.   11. I acknowledge that my physician has explained sedation/analgesia administration to me including the risk and benefits I consent to the administration of sedation/analgesia as may be necessary or desirable in the judgment of my physician.    I CERTIFY THAT I HAVE READ AND FULLY UNDERSTAND THE ABOVE CONSENT TO OPERATION and/or OTHER PROCEDURE.     ____________________________________  _________________________________        ______________________________  Signature of Patient    Signature of Responsible Person                Printed Name of Responsible Person                                      ____________________________________  _____________________________                ________________________________  Signature of Witness        Date  Time         Relationship to Patient    STATEMENT OF PHYSICIAN My signature below affirms that prior to the time of the procedure; I have explained to the patient and/or his/her legal representative, the risks and benefits involved in the proposed treatment and any reasonable alternative to the proposed treatment. I have also explained the risks and benefits involved in refusal of the proposed treatment and alternatives to the  proposed treatment and have answered the patient's questions. If I have a significant financial interest in a co-management agreement or a significant financial interest in any product or implant, or other significant relationship used in this procedure/surgery, I have disclosed this and had a discussion with my patient.     _____________________________________________________              _____________________________  (Signature of Physician)                                                                                         (Date)                                   (Time)  Patient Name: Marisol Maher      : 1935      Printed: 2024     Medical Record #: [id number]                                      Page 1 of 1

## (undated) NOTE — LETTER
[facility]  [address], [place], [place]    Authorization for Surgical Operation and Procedure                               I hereby authorize Beni Lynn MD, my physician and his/her assistants (if applicable), which may include medical students, residents, and/or fellows, to perform the following surgical operation/ procedure and administer such anesthesia as may be determined necessary by my physician: Operation/Procedure name (s) Cystoscopy, dual ureteral bilateral lighted stents on Marisol Maher   2.   I recognize that during the surgical operation/procedure, unforeseen conditions may necessitate additional or different procedures than those listed above.  I, therefore, further authorize and request that the above-named surgeon, assistants, or designees perform such procedures as are, in their judgment, necessary and desirable.    3.   My surgeon/physician has discussed prior to my surgery the potential benefits, risks and side effects of this procedure; the likelihood of achieving goals; and potential problems that might occur during recuperation.  They also discussed reasonable alternatives to the procedure, including risks, benefits, and side effects related to the alternatives and risks related to not receiving this procedure.  I have had all my questions answered and I acknowledge that no guarantee has been made as to the result that may be obtained.    4.   Should the need arise during my operation/procedure, which includes change of level of care prior to discharge, I also consent to the administration of blood and/or blood products.  Further, I understand that despite careful testing and screening of blood or blood products by collecting agencies, I may still be subject to ill effects as a result of receiving a blood transfusion and/or blood products.  The following are some, but not all, of the potential risks that can occur: fever and allergic reactions, hemolytic  reactions, transmission of diseases such as Hepatitis, AIDS and Cytomegalovirus (CMV) and fluid overload.  In the event that I wish to have an autologous transfusion of my own blood, or a directed donor transfusion, I will discuss this with my physician.  Check only if Refusing Blood or Blood Products  I understand refusal of blood or blood products as deemed necessary by my physician may have serious consequences to my condition to include possible death. I hereby assume responsibility for my refusal and release the hospital, its personnel, and my physicians from any responsibility for the consequences of my refusal.    o  Refuse   5.   I authorize the use of any specimen, organs, tissues, body parts or foreign objects that may be removed from my body during the operation/procedure for diagnosis, research or teaching purposes and their subsequent disposal by hospital authorities.  I also authorize the release of specimen test results and/or written reports to my treating physician on the hospital medical staff or other referring or consulting physicians involved in my care, at the discretion of the Pathologist or my treating physician.    6.   I consent to the photographing or videotaping of the operations or procedures to be performed, including appropriate portions of my body for medical, scientific, or educational purposes, provided my identity is not revealed by the pictures or by descriptive texts accompanying them.  If the procedure has been photographed/videotaped, the surgeon will obtain the original picture, image, videotape or CD.  The hospital will not be responsible for storage, release or maintenance of the picture, image, tape or CD.    7.   I consent to the presence of a  or observers in the operating room as deemed necessary by my physician or their designees.    8.   I recognize that in the event my procedure results in extended X-Ray/fluoroscopy time, I may develop a skin  reaction.    9. If I have a Do Not Attempt Resuscitation (DNAR) order in place, that status will be suspended while in the operating room, procedural suite, and during the recovery period unless otherwise explicitly stated by me (or a person authorized to consent on my behalf). The surgeon or my attending physician will determine when the applicable recovery period ends for purposes of reinstating the DNAR order.  10. Patients having a sterilization procedure: I understand that if the procedure is successful the results will be permanent and it will therefore be impossible for me to inseminate, conceive, or bear children.  I also understand that the procedure is intended to result in sterility, although the result has not been guaranteed.   11. I acknowledge that my physician has explained sedation/analgesia administration to me including the risk and benefits I consent to the administration of sedation/analgesia as may be necessary or desirable in the judgment of my physician.    I CERTIFY THAT I HAVE READ AND FULLY UNDERSTAND THE ABOVE CONSENT TO OPERATION and/or OTHER PROCEDURE.     ____________________________________  _________________________________        ______________________________  Signature of Patient    Signature of Responsible Person                Printed Name of Responsible Person                                      ____________________________________  _____________________________                ________________________________  Signature of Witness        Date  Time         Relationship to Patient    STATEMENT OF PHYSICIAN My signature below affirms that prior to the time of the procedure; I have explained to the patient and/or his/her legal representative, the risks and benefits involved in the proposed treatment and any reasonable alternative to the proposed treatment. I have also explained the risks and benefits involved in refusal of the proposed treatment and alternatives to the proposed  treatment and have answered the patient's questions. If I have a significant financial interest in a co-management agreement or a significant financial interest in any product or implant, or other significant relationship used in this procedure/surgery, I have disclosed this and had a discussion with my patient.     _____________________________________________________              _____________________________  (Signature of Physician)                                                                                         (Date)                                   (Time)  Patient Name: Marisol Maher      : 1935      Printed: 2024     Medical Record #: [id number]                                      Page 1 of 1